# Patient Record
Sex: FEMALE | Race: WHITE | HISPANIC OR LATINO | Employment: UNEMPLOYED | URBAN - METROPOLITAN AREA
[De-identification: names, ages, dates, MRNs, and addresses within clinical notes are randomized per-mention and may not be internally consistent; named-entity substitution may affect disease eponyms.]

---

## 2023-03-06 ENCOUNTER — TELEPHONE (OUTPATIENT)
Dept: OBGYN | Facility: CLINIC | Age: 33
End: 2023-03-06
Payer: COMMERCIAL

## 2023-03-06 DIAGNOSIS — Z32.01 PREGNANCY TEST POSITIVE: Primary | ICD-10-CM

## 2023-03-06 NOTE — TELEPHONE ENCOUNTER
[facility] Call Center    Phone Message    May a detailed message be left on voicemail: yes     Reason for Call: Order(s): Other:   Reason for requested: OBI US  Date needed: 4/11/23  Provider name: Massiel      Action Taken: Message routed to:  Other: pancho rn    Travel Screening: Not Applicable

## 2023-04-10 LAB
ABO/RH(D): NORMAL
ANTIBODY SCREEN: NEGATIVE
SPECIMEN EXPIRATION DATE: NORMAL

## 2023-04-11 ENCOUNTER — APPOINTMENT (OUTPATIENT)
Dept: LAB | Facility: CLINIC | Age: 33
End: 2023-04-11
Attending: ADVANCED PRACTICE MIDWIFE
Payer: COMMERCIAL

## 2023-04-11 ENCOUNTER — ANCILLARY PROCEDURE (OUTPATIENT)
Dept: ULTRASOUND IMAGING | Facility: CLINIC | Age: 33
End: 2023-04-11
Attending: ADVANCED PRACTICE MIDWIFE
Payer: COMMERCIAL

## 2023-04-11 ENCOUNTER — PRE VISIT (OUTPATIENT)
Dept: MATERNAL FETAL MEDICINE | Facility: CLINIC | Age: 33
End: 2023-04-11

## 2023-04-11 ENCOUNTER — OFFICE VISIT (OUTPATIENT)
Dept: OBGYN | Facility: CLINIC | Age: 33
End: 2023-04-11
Attending: ADVANCED PRACTICE MIDWIFE
Payer: COMMERCIAL

## 2023-04-11 ENCOUNTER — TRANSCRIBE ORDERS (OUTPATIENT)
Dept: MATERNAL FETAL MEDICINE | Facility: CLINIC | Age: 33
End: 2023-04-11
Payer: COMMERCIAL

## 2023-04-11 VITALS
WEIGHT: 104.9 LBS | SYSTOLIC BLOOD PRESSURE: 96 MMHG | DIASTOLIC BLOOD PRESSURE: 64 MMHG | HEART RATE: 72 BPM | HEIGHT: 59 IN | BODY MASS INDEX: 21.15 KG/M2

## 2023-04-11 DIAGNOSIS — Z83.3 FAMILY HISTORY OF DIABETES MELLITUS: ICD-10-CM

## 2023-04-11 DIAGNOSIS — O26.90 PREGNANCY RELATED CONDITION, ANTEPARTUM: Primary | ICD-10-CM

## 2023-04-11 DIAGNOSIS — Z32.01 PREGNANCY TEST POSITIVE: ICD-10-CM

## 2023-04-11 DIAGNOSIS — Z34.02 ENCOUNTER FOR SUPERVISION OF NORMAL FIRST PREGNANCY IN SECOND TRIMESTER: Primary | ICD-10-CM

## 2023-04-11 LAB
DEPRECATED CALCIDIOL+CALCIFEROL SERPL-MC: 26 UG/L (ref 20–75)
ERYTHROCYTE [DISTWIDTH] IN BLOOD BY AUTOMATED COUNT: 12.2 % (ref 10–15)
HBA1C MFR BLD: 4.6 %
HBV SURFACE AB SERPL IA-ACNC: 0.74 M[IU]/ML
HBV SURFACE AB SERPL IA-ACNC: NONREACTIVE M[IU]/ML
HBV SURFACE AG SERPL QL IA: NONREACTIVE
HCT VFR BLD AUTO: 38.8 % (ref 35–47)
HCV AB SERPL QL IA: NONREACTIVE
HGB BLD-MCNC: 13.9 G/DL (ref 11.7–15.7)
HIV 1+2 AB+HIV1 P24 AG SERPL QL IA: NONREACTIVE
MCH RBC QN AUTO: 32.2 PG (ref 26.5–33)
MCHC RBC AUTO-ENTMCNC: 35.8 G/DL (ref 31.5–36.5)
MCV RBC AUTO: 90 FL (ref 78–100)
PLATELET # BLD AUTO: 217 10E3/UL (ref 150–450)
RBC # BLD AUTO: 4.32 10E6/UL (ref 3.8–5.2)
WBC # BLD AUTO: 11.6 10E3/UL (ref 4–11)

## 2023-04-11 PROCEDURE — 83036 HEMOGLOBIN GLYCOSYLATED A1C: CPT | Performed by: ADVANCED PRACTICE MIDWIFE

## 2023-04-11 PROCEDURE — 85049 AUTOMATED PLATELET COUNT: CPT | Performed by: ADVANCED PRACTICE MIDWIFE

## 2023-04-11 PROCEDURE — 76801 OB US < 14 WKS SINGLE FETUS: CPT | Mod: 26 | Performed by: OBSTETRICS & GYNECOLOGY

## 2023-04-11 PROCEDURE — 86850 RBC ANTIBODY SCREEN: CPT | Performed by: ADVANCED PRACTICE MIDWIFE

## 2023-04-11 PROCEDURE — 87086 URINE CULTURE/COLONY COUNT: CPT | Performed by: ADVANCED PRACTICE MIDWIFE

## 2023-04-11 PROCEDURE — 86762 RUBELLA ANTIBODY: CPT | Performed by: ADVANCED PRACTICE MIDWIFE

## 2023-04-11 PROCEDURE — 86803 HEPATITIS C AB TEST: CPT | Performed by: ADVANCED PRACTICE MIDWIFE

## 2023-04-11 PROCEDURE — 87389 HIV-1 AG W/HIV-1&-2 AB AG IA: CPT | Performed by: ADVANCED PRACTICE MIDWIFE

## 2023-04-11 PROCEDURE — 87340 HEPATITIS B SURFACE AG IA: CPT | Performed by: ADVANCED PRACTICE MIDWIFE

## 2023-04-11 PROCEDURE — 99207 PR PRENATAL VISIT: CPT | Performed by: ADVANCED PRACTICE MIDWIFE

## 2023-04-11 PROCEDURE — 86787 VARICELLA-ZOSTER ANTIBODY: CPT | Performed by: ADVANCED PRACTICE MIDWIFE

## 2023-04-11 PROCEDURE — 36415 COLL VENOUS BLD VENIPUNCTURE: CPT | Performed by: ADVANCED PRACTICE MIDWIFE

## 2023-04-11 PROCEDURE — 86706 HEP B SURFACE ANTIBODY: CPT | Performed by: ADVANCED PRACTICE MIDWIFE

## 2023-04-11 PROCEDURE — 82306 VITAMIN D 25 HYDROXY: CPT | Performed by: ADVANCED PRACTICE MIDWIFE

## 2023-04-11 PROCEDURE — 76801 OB US < 14 WKS SINGLE FETUS: CPT

## 2023-04-11 PROCEDURE — G0463 HOSPITAL OUTPT CLINIC VISIT: HCPCS | Mod: 25 | Performed by: ADVANCED PRACTICE MIDWIFE

## 2023-04-11 PROCEDURE — 86780 TREPONEMA PALLIDUM: CPT | Performed by: ADVANCED PRACTICE MIDWIFE

## 2023-04-11 PROCEDURE — 85660 RBC SICKLE CELL TEST: CPT | Performed by: ADVANCED PRACTICE MIDWIFE

## 2023-04-11 RX ORDER — PRENATAL VIT/IRON FUM/FOLIC AC 27MG-0.8MG
1 TABLET ORAL DAILY
COMMUNITY

## 2023-04-11 ASSESSMENT — ANXIETY QUESTIONNAIRES
GAD7 TOTAL SCORE: 7
5. BEING SO RESTLESS THAT IT IS HARD TO SIT STILL: SEVERAL DAYS
6. BECOMING EASILY ANNOYED OR IRRITABLE: MORE THAN HALF THE DAYS
2. NOT BEING ABLE TO STOP OR CONTROL WORRYING: NOT AT ALL
IF YOU CHECKED OFF ANY PROBLEMS ON THIS QUESTIONNAIRE, HOW DIFFICULT HAVE THESE PROBLEMS MADE IT FOR YOU TO DO YOUR WORK, TAKE CARE OF THINGS AT HOME, OR GET ALONG WITH OTHER PEOPLE: NOT DIFFICULT AT ALL
7. FEELING AFRAID AS IF SOMETHING AWFUL MIGHT HAPPEN: SEVERAL DAYS
GAD7 TOTAL SCORE: 7
1. FEELING NERVOUS, ANXIOUS, OR ON EDGE: SEVERAL DAYS
1. FEELING NERVOUS, ANXIOUS, OR ON EDGE: SEVERAL DAYS
3. WORRYING TOO MUCH ABOUT DIFFERENT THINGS: SEVERAL DAYS
GAD7 TOTAL SCORE: 7
2. NOT BEING ABLE TO STOP OR CONTROL WORRYING: NOT AT ALL
3. WORRYING TOO MUCH ABOUT DIFFERENT THINGS: SEVERAL DAYS
7. FEELING AFRAID AS IF SOMETHING AWFUL MIGHT HAPPEN: SEVERAL DAYS
5. BEING SO RESTLESS THAT IT IS HARD TO SIT STILL: SEVERAL DAYS
6. BECOMING EASILY ANNOYED OR IRRITABLE: MORE THAN HALF THE DAYS

## 2023-04-11 ASSESSMENT — PATIENT HEALTH QUESTIONNAIRE - PHQ9
5. POOR APPETITE OR OVEREATING: SEVERAL DAYS
5. POOR APPETITE OR OVEREATING: SEVERAL DAYS
SUM OF ALL RESPONSES TO PHQ QUESTIONS 1-9: 8

## 2023-04-11 NOTE — LETTER
"2023       RE: Jennifer Roberts  [address] [place] [address] [address]  [facility] [address]     Dear Colleague,    Thank you for referring your patient, Jennifer Roberts, to the [facility] [place] at [facility]. Please see a copy of my visit note below.    [facility] OB Intake note  Subjective   32 year old femalepresents to clinic for initiation of OB care. Patient's last menstrual period was 2023.  at 12w0d by Estimated Date of Delivery: Oct 21, 2023 based on LMP. Reviewed dating ultrasound. Pregnancy is planned.    Partner name - Sathya.        Symptoms since LMP include nausea and fatigue. Patient has tried these relief measures: diet modification, small frequent meals, increased rest and increased fluids. Discussed unisom and vit b6.    - Genetic/Infection questionnaire completed, risks include: mother with \"pre-diabetes,\" is on medication; hbg electrophoresis   Pt  does not have a recent known exposure to Parvo or CMV so IgG/IgM testing WILL NOT be ordered.   OTHER:  - Mom with pre-diabetes, takes meds    - Current Medications    Current Outpatient Medications   Medication Sig Dispense Refill     Prenatal Vit-Fe Fumarate-FA (PRENATAL MULTIVITAMIN W/IRON) 27-0.8 MG tablet Take 1 tablet by mouth daily           - Co-morbids  History reviewed. No pertinent past medical history.  - Risk for GDM : First degree relative with GDM or DM so  WILL have an  Hgb A1C, discuss early 1 hour    - High risk factors for Pre E-  No known risk factors of High risk for Pre E     Pregnant individuals at high risk of preeclampsia with one or more of the following risk factors:  History of preeclampsia, especially when accompanied by an adverse outcome  Hx of Gestational Hypertension (new 2022 per [facility])  Multifetal gestation  Chronic hypertension  Pregestational type 1 or 2 diabetes  Kidney disease  Autoimmune disease (ie, systemic lupus erythematous, " antiphospholipid syndrome)  Combinations of multiple moderate-risk factors    - Moderate risk factor for Pre E Nulliparity   Meets one high risk factors or one of the moderate risk facrtors  Nulliparity  Obesity (ie, body mass index > 30)  Family history of preeclampsia (ie, mother or sister)  Black race (as a proxy for underlying racism)  Lower income  Age 35 years or older  Personal history factors (eg, low birth weight or small for gestational age, previous adverse pregnancy outcome, >10-year pregnancy interval)  In vitro fertilization  so WILL NOT consider starting low dose aspirin (81mg) starting between 12 and 28 weeks to prevent early onset preeclampsia      - The patient  does not have a history of spontaneous  birth so  WILL NOT consider progesterone starting at 16-20 weeks and/or serial transvaginal cervical length ultrasounds from 16-24 weeks.     -The patient does not have a history of immunosuppresion or HIV so Toxoplasma IgG/IgM WILL NOT be ordered.    -Assess risk for asymptomatic latent TB (prior infection, recent immigrant from epidemic areas, immunosuppression, living in overcrowded environment):   WILL NOT have PPD skin test or Quantiferon-TB Gold Plus blood draw. *both options valid*       PERSONAL/SOCIAL HISTORY  , maira    Employment: not working   Her partner works as an   History of anxiety or depression  no-  Additional items: Denies past or present domestic violence, sexual and psychological abuse.    Objective  -VS: reviewed and within normal limits   -General appearance: no acute distress, patient is comfortable   NEUROLOGICAL/PSYCHIATRIC   - Oriented x3,   -Mood and affect: : normal     Last pap 1.5 years ago, WNL    Assessment/Plan  Jennifer was seen today for prenatal care.    Diagnoses and all orders for this visit:    Encounter for supervision of normal first pregnancy in second trimester  -     ABO/Rh type and screen  -     CBC with platelets  -     Vitamin D  Deficiency  -     Rubella Antibody IgG  -     Treponema Abs w Reflex to RPR and Titer  -     HIV Antigen Antibody Combo  -     Hepatitis B surface antigen  -     Hepatitis B Surface Antibody  -     Hepatitis C antibody  -     Urine Culture  -     Varicella Zoster Virus Antibody IgG  -     Hemoglobin A1c  -     Mat Fetal [facility] Referral - Pregnancy; Future  -     Hgb with reflex to ELP or RBC Solubility (Sickle Cell Screen)    Family history of diabetes mellitus        32 year old  12w3d weeks of pregnancy with VANDANA of Oct 21, 2023 by LMP of Patient's last menstrual period was 2023.. Ultrasound confirms.   Outpatient Encounter Medications as of 2023   Medication Sig Dispense Refill     Prenatal Vit-Fe Fumarate-FA (PRENATAL MULTIVITAMIN W/IRON) 27-0.8 MG tablet Take 1 tablet by mouth daily       No facility-administered encounter medications on file as of 2023.      Orders Placed This Encounter   Procedures     CBC with platelets     Vitamin D Deficiency     Rubella Antibody IgG     Treponema Abs w Reflex to RPR and Titer     HIV Antigen Antibody Combo     Hepatitis B surface antigen     Hepatitis B Surface Antibody     Hepatitis C antibody     Varicella Zoster Virus Antibody IgG     Hemoglobin A1c     Hgb with reflex to ELP or RBC Solubility (Sickle Cell Screen)     [facility] Referral - Pregnancy     Adult Type and Screen                 Orders Placed This Encounter   Procedures     CBC with platelets     Vitamin D Deficiency     Rubella Antibody IgG     Treponema Abs w Reflex to RPR and Titer     HIV Antigen Antibody Combo     Hepatitis B surface antigen     Hepatitis B Surface Antibody     Hepatitis C antibody     Varicella Zoster Virus Antibody IgG     Hemoglobin A1c     Hgb with reflex to ELP or RBC Solubility (Sickle Cell Screen)     [facility] Referral - Pregnancy     Adult Type and Screen     ABO/Rh type and screen       - Oriented to Practice, types of care, and how to reach  a provider.  Pt prefers CNM team  - Patient received 1st trimester new OB education packet complete with aide of [employer] Expectant Family booklet including information on genetic screening test options.  - Patient desires 1st trimester screening and desires level II ultrasound which were ordered.  - Educational handout on the prevention of infections diseases during pregnancy provided.  - Patient was encouraged to start prenatal vitamins as tolerated.      - Pregnancy concerns to be addressed by provider at new OB exam include: early 1 hour, gc/ct.    - OBI education reviewed.  - OBI labs ordered.  - Hbg electrophoresis added to labs.  - HbgA1c added to labs. Discuss early 1 hour at NOB.  - Pap up to date.  - Needs GC/CT at NOB.  - [facility] referral placed for 1st trimester screening and level 2 ultrasound.     Pt to RTO for NOB visit in 2-4 weeks and prn if questions or concerns    JARED Lowery CNM                Again, thank you for allowing me to participate in the care of your patient.      Sincerely,    Massiel Glaser CNM

## 2023-04-11 NOTE — PATIENT INSTRUCTIONS
"Thank you for trusting us with your care!     If you need to contact us for questions about:  Symptoms, Scheduling & Medical Questions; Non-urgent (2-3 day response) Russcj message, Urgent (needing response today) [phone] (if after 3:30pm next day response)   Prescriptions: Please call your Pharmacy   Billing: [facility] [phone] or [facility]:[phone]      Pregnancy: Your First Trimester Changes  The first trimester is a time of rapid development for your baby. Because your baby is growing so quickly, it is important that you start a healthy lifestyle right away. By the end of the first trimester, your baby has formed all of its major body organs and weighs just over an ounce.  Month 1 (weeks 1 to 4)  The placenta (the organ that nourishes your baby) begins to form. The brain, spinal cord, heart, gastrointestinal tract, and lungs begin to develop. Your baby is about   inch long by the end of the first month.     Actual size of baby is 1/4\".     Month 2 (weeks 5 to 8)  All of your baby s major body organs form. The face, fingers, toes, ears, and eyes appear. By the end of the month, your baby is about 1 inch long.     Actual size of baby is 1\".     Month 3 (weeks 9 to 12)  Your baby can open and close its fists and mouth. The sexual organs begin to form. As the first trimester ends, your baby is about 3 inches long.     Actual size of baby is 3\".     [employer] last reviewed this educational content on 8/1/2020 2000-2022 [employer]. All rights reserved. This information is not intended as a substitute for professional medical care. Always follow your healthcare professional's instructions.          Vitamin B6 (pyridoxine) Oral Tablet  Uses  This medicine is used for the following purposes:  metabolism disorder  nausea and vomiting  vitamin deficiency  Instructions  This medicine may be taken with or without food.  Store at room temperature away from heat, light, and moisture. Do not " keep in the bathroom.  If you forget to take a dose on time, take it as soon as you remember. If it is almost time for the next dose, do not take the missed dose. Return to your normal dosing schedule. Do not take 2 doses of this medicine at one time.  Drug interactions can change how medicines work or increase risk for side effects. Tell your health care providers about all medicines taken. Include prescription and over-the-counter medicines, vitamins, and herbal medicines. Speak with your doctor or pharmacist before starting or stopping any medicine.  Speak with your doctor or pharmacist before starting any other vitamins.  It is very important that you follow your doctor's instructions for all blood tests.  Cautions  Tell your doctor and pharmacist if you ever had an allergic reaction to a medicine.  Do not use the medication any more than instructed.  Tell the doctor or pharmacist if you are pregnant, planning to be pregnant, or breastfeeding.  Side Effects  If you have any of the following side effects, you may be getting too much medicine. Please contact your doctor to let them know about these side effects.  drowsiness or sedation  numbness or tingling in hands and feet  headaches  nausea  stomach upset or abdominal pain  This medicine usually has no side effects.  A few people may have an allergic reaction to this medicine. Symptoms can include difficulty breathing, skin rash, itching, swelling, or severe dizziness. If you notice any of these symptoms, seek medical help quickly.  Extra  Please speak with your doctor, nurse, or pharmacist if you have any questions about this medicine.  [email or web address]  IMPORTANT NOTE: This document tells you briefly how to take your medicine, but it does not tell you all there is to know about it. Your doctor or pharmacist may give you other documents about your medicine. Please talk to them if you have any questions. Always follow their advice.  There is a more complete description of this medicine available in English. Scan this code on your smartphone or tablet or use the web address below. You can also ask your pharmacist for a printout. If you have any questions, please ask your pharmacist. The display and use of this drug information is subject to Terms of Use. Copyright(c) 2022 [employer]     0110-0292 [employer]. All rights reserved. This information is not intended as a substitute for professional medical care. Always follow your healthcare professional's instructions.          Adapting to Pregnancy: First Trimester  As your body adjusts during your first trimester of pregnancy, you may have to change or limit your daily activities. You ll need more rest. You may also need to use the energy you have more wisely.   Your changing body  Almost every part of your body is affected as you adapt to pregnancy. The uterus and cervix will start to soften right away. You may not look very pregnant during the first 3 months. But you are likely to have some common signs of early pregnancy:   Nausea  Fatigue  Frequent urination  Mood swings  Bloating of the belly  Constipation  Heartburn  Missed or light periods (first trimester bleeding)  Nipple or breast tenderness and breast swelling  It s not too late to start good habits   What matters most is protecting your baby from this moment on. If you smoke, drink alcohol, or use drugs, now is the time to stop. If you need help, talk with your healthcare provider:   Smoking increases the risk of stillbirth or having a low-birth-weight baby. If you smoke, quit now.  Alcohol and drugs have been linked with miscarriage, birth defects, intellectual disability, and low birth weight. Don't drink alcohol or take drugs.  Tips to relieve nausea  During pregnancy, nausea can happen at any time of the day, but it may be worse in the morning. To help prevent nausea:   Eat small, light meals at frequent  intervals.  Drink fluids often.  Get up slowly. Eat a few unsalted crackers before you get out of bed.  Avoid smells that bother you.  Avoid spicy and fatty foods.  Eat an ice pop in your favorite flavor.  Get plenty of rest.  Ask your healthcare provider about taking faheem or vitamin B6 for nausea and vomiting.  Talk with your healthcare provider if you take vitamins that upset your stomach.   Work concerns  The end of the first trimester is a good time to discuss working during pregnancy with your employer. Follow your healthcare provider s advice if your job needs you to stand for a long time, work with hazardous tools, or even sit at a desk all day. Your workspace, workload, or scheduled hours may need to be adjusted. Perhaps you can change body postures more often or take an extra break.   Advice for travel  Talk to your healthcare provider first, but the second trimester may be the best time for any travel. You may be advised to avoid certain trips while you re pregnant. Food and water can be concerns in developing countries. Travel by car is a good choice, as you can stop, get out, and stretch. Bring snacks and water along. Fasten the lap belt below your belly, low over your hips. Also be sure to wear the shoulder harness.   Intimacy  Unless your healthcare provider tells you to, there's no reason to stop having sex while you re pregnant. You or your partner may notice changes in desire. Desire may be less in the first trimester, due to nausea and fatigue. In the second trimester, sex may be very enjoyable. The third trimester can be a challenge comfort-wise. Try different positions and see what s best for you both.   [employer] last reviewed this educational content on 4/1/2020 2000-2022 [employer]. All rights reserved. This information is not intended as a substitute for professional medical care. Always follow your healthcare professional's instructions.          Pregnancy: Common  Questions  There are plenty of myths and  old wives  tales  about pregnancy. You may need help  fact from fiction. On this sheet, you ll find answers to a few common questions. If you have other questions, talk with your healthcare provider.    Will working harm my baby?  In most cases, working throughout your pregnancy is not harmful at all. There may be concerns if the job involves dangerous machinery or chemicals, lifting, or standing for very long periods of time. Talk with your healthcare provider and employer about your particular job and pregnancy.  Is it safe to have sex during pregnancy?  Yes, unless you are specifically advised not to by your healthcare provider.  Why can t I change the cat litter box?  Cats carry a disease called toxoplasmosis. In adult humans, it shows up as a mild infection of the blood and organs. If you are infected during pregnancy, the baby s brain and eyes could be damaged. To be safe, have someone else change the litter. If you must handle it, wear a paper mask over your nose and mouth. Also, wear gloves and wash your hands afterward.  Which medicines are safe?  No prescription or over-the-counter medicine is safe for everyone all of the time. But sometimes medicines are needed. Be sure your healthcare provider knows you are pregnant. Then use only the medicines he or she advises you to take.  Is it true that I can overheat my baby?  Yes. To prevent making your baby too warm:  Don t sit in a Jacuzzi. A long, warm bath is fine, but not in water over 100 F (37.7 C).  Exercise less intensely if you feel tired. Base your workout on how you feel, not your heart rate. Heart rates aren t a good way to measure effort during pregnancy.  Can I lift and carry safely?  Yes, if your healthcare provider doesn t tell you otherwise. Learn to lift and carry safely to prevent injury and reduce back pain during pregnancy. To protect your back:  Bend at the knees to bring the load  nearer.  Get a good . Test the weight of the load.  Tighten your belly. Exhale as you lift.  Lift with your legs, not with your back.  Carry the load close to your body.  Hold the load so you can see where you are going.  What if I get sick?  Most women get sick at least once during pregnancy. Talk with your healthcare provider if you do. Most likely it will not affect your pregnancy. Get plenty of rest and fluids, and eat what you can. Talk with your provider before taking any medicines.  [employer] last reviewed this educational content on 8/1/2020 2000-2022 [employer]. All rights reserved. This information is not intended as a substitute for professional medical care. Always follow your healthcare professional's instructions.          Comfort Tips During Pregnancy  Pregnancy can bring discomfort of different kinds. Below are tips for ways to feel better. Talk with your healthcare provider before using pain-relieving medicine at any time during your pregnancy.    First trimester tips  Easing nausea  Get up slowly. Eat a few unsalted crackers before you get out of bed.  Avoid smells that bother you.  Eat small, bland, low-fat, high-protein meals at frequent intervals.  Sip on water, weak tea, or clear soft drinks, like ginger ale. Eat ice chips.  Try taking vitamin B6.  Coping with fatigue  Take catnaps when you can.  Get regular exercise.  Accept help from others.  Practice good sleep habits, like going to bed and getting up at the same time each day. Use your bed only for sleep and sex.  Calming mood swings  Talk about your feelings with others, including other mothers.  Limit sugar, chocolate, and caffeine.  Eat a healthy diet. Don t skip meals.  Get regular exercise.  Soothing headaches  Get fresh air and exercise.  Relax and get enough rest.  Check with your healthcare provider before taking any pain medicines.    Second trimester tips  To limit ankle swelling, sit with your feet raised or  wear support hose.  If you have pain in your groin and stomach (round ligament pain), don't make sudden twisting movements with your body.  For leg cramps, flexing your foot often brings immediate relief. Also try massaging your calf in long, downward strokes, or stretching your legs before going to bed. Get enough exercise and wear shoes with flexible soles. Eat foods rich in calcium.    Third trimester tips  Reducing heartburn  Eat small, light meals throughout the day rather than 3 large ones.  Sleep with your upper body raised 6 inches. Don t lie down until 2 hours after you eat.  Don't eat greasy, fried, or spicy foods.  Don't have citrus fruits or juices.  Treating constipation  Eat foods high in fiber, such as whole-grain foods, and fresh fruit and vegetables).  Drink plenty of water.  Get regular exercise.  Ask about your healthcare provider about medicines that have docusate or psyllium.  Taking care of your breasts  Don't use harsh soaps or alcohol, which can make your skin too dry.  Wear nursing bras. They provide more support than regular bras and can be used after pregnancy if you breastfeed.  Getting a good night s sleep  Take a warm shower before bed.  Sleep on a firm mattress.  Lie on your side with 1 leg crossed over the other.  Use pillows to support your arms, legs, and belly.    [employer] last reviewed this educational content on 8/1/2020 2000-2022 [employer]. All rights reserved. This information is not intended as a substitute for professional medical care. Always follow your healthcare professional's instructions.          Folic Acid Supplements  Folic acid is also called folate. It is one of the B vitamins. Nutrition experts are just starting to learn more about how folic acid helps the body. It is needed to prevent a shortage of red blood cells (a type of anemia). Experts have also found that folic acid can prevent some birth defects.     How much folic acid do you  need?  Adults should have 400 mcg (micrograms) of folic acid each day. Adults may need more if they are planning to get pregnant, are pregnant, or are breastfeeding. Talk with your healthcare provider to find the right amount of folic acid for you.   Why use a supplement?  Taking folic acid both before and during pregnancy is important. This can prevent birth defects of the spine and brain (neural tube defects). A supplement may also be helpful if you drink alcohol often. You may want to use a folic acid supplement if any of the following is true for you:   [] I am a person of childbearing age.   [] I am planning to get pregnant.  [] I rarely eat leafy green vegetables, dried beans, or lentils.   [] I rarely eat cereal and whole grains (wheat germ, brown rice, whole-wheat bread).  [] I often have more than 1 drink of alcohol a day.   If you take folic acid  Here are some tips to help you get the most from a folic acid supplement:  Read the label to be sure the product won't  soon.  Choose a supplement that provides 400 to 800 mcg of folic acid.  Store the supplement in a cool, dry place, away from sun and heat.  Eat a healthy diet to get all the nutrients your body needs.  Foods that have folic acid  Folic acid is found mainly in plants. Some good sources include:  Dark green leafy vegetables such as spinach, kale, collards, and jeaneth lettuce  Lentils, chickpeas, and dried beans such as sabillon, kidney, and black beans  Asparagus, [place], broad-beans, broccoli, and [place] sprouts  Avocados, oranges, and orange juice  Wheat germ and whole-wheat products  Products fortified with folic acid, such as cereal, pasta, bread, and rice  [employer] last reviewed this educational content on 2022-2022 [employer]. All rights reserved. This information is not intended as a substitute for professional medical care. Always follow your healthcare professional's instructions.          Anemia During  Pregnancy  Anemia is a condition in which the red blood cell count is too low. In pregnant women, this is often caused by not having enough iron in the blood. Anemia is common in pregnancy and very easy to treat.   Why you need iron   While pregnant, your body uses iron to make red blood cells for you and your baby. These cells bring oxygen to your baby and to the rest of your body. Not having enough red blood cells can cause your baby to be born too small. But this is rare, as it s easy for you to get enough iron.   Testing for anemia   The only way to know if you have anemia is to have a simple test called a CBC (complete blood count). This is a routine test that will be done at one of your first prenatal visits. This test may be done again, at about week 26 to week 28.   Treating anemia   If you have anemia due to low iron content, follow the advice of your healthcare provider. Eating foods high in iron and taking supplements can help you get the iron you need.   Eating foods high in iron      Green leafy vegetables and nuts are a good source of iron.     Eat foods that are high in iron such as:   Red meat (limit organ meats such as liver)  Seafood (be sure it s fully cooked), and don't eat fish that are high in mercury, such as swordfish, tilefish, jyothi mackerel, and shark  Tofu  Eggs  Green, leafy vegetables  Whole grains and iron-fortified cereals  Dried fruits and nuts  Taking iron supplements   In most cases, a prenatal vitamin can provide enough iron. But if you need more, your healthcare provider may prescribe an iron supplement. Swallow iron pills with a glass of orange or cranberry juice. The vitamin C in these fruit juices can help your body absorb iron. But don t take your iron pills with juices that have calcium added to them. They can keep your body from absorbing the iron.   Iron supplements   Iron supplements may have certain side effects. They may cause your stools to turn black, and make you feel  sick to your stomach or constipated. Here are some tips that may help you limit side effects:   Start slowly. Take 1 pill a day for a few days. Then work up to your prescribed dose over time.  Take your pills with meals, and not at bedtime.  Increase the fiber in your diet. Eat more whole grains, fruits, and vegetables.  Do mild exercise each day.  If advised by your healthcare provider, take a stool softener.  [employer] last reviewed this educational content on 2/1/2020 2000-2022 [employer]. All rights reserved. This information is not intended as a substitute for professional medical care. Always follow your healthcare professional's instructions.          Rh-Negative Screening  If you have Rh-negative blood, your baby may be at risk for health problems. This is true only if your baby has Rh-positive blood. A simple test followed by treatment can help prevent problems.   What are the risks?  If the blood of your baby is Rh positive, your Rh-negative blood may form antibodies. These antibodies will attack the Rh-positive blood. This is called Rh disease. Rh disease can cause your baby to lose blood cells or have other health problems. Medical treatment can prevent Rh disease by keeping antibodies from forming.   How are you tested?  A simple blood test shows if you re Rh negative. This test is done very early in your pregnancy. If you re Rh negative, you ll have a second blood test near week 28 of pregnancy. This test will check whether or not your blood contains Rh antibodies.     [employer] last reviewed this educational content on 4/1/2020 2000-2022 [employer]. All rights reserved. This information is not intended as a substitute for professional medical care. Always follow your healthcare professional's instructions.          What Is Prenatal Care?  Before getting pregnant, you may have added some good health habits to get ready for your baby. But if you didn t, start today. One  of the first steps is learning how to take care of yourself. See your healthcare provider as soon as you think you may be pregnant. Then continue prenatal care during your pregnancy.     Prenatal care helps you have a healthy baby   During prenatal care:  Your healthcare provider checks the health of your pregnancy. They'll calculate a due date. This gives an estimate of your baby's delivery. Many people give birth between 38 and 41 weeks of pregnancy. Your due date is found by counting 40 weeks from the first day of your last menstrual period.  Your pregnancy's progress is checked. This includes your baby s growth, fetal heart rate, changes in your weight and blood pressure, and your overall health and comfort.  Your provider may find new concerns and manage current ones before problems happen.  Your provider will check lab work through blood and urine.  Your provider will talk about normal changes that happen during pregnancy. They'll also talk about changes that may not be normal. And they'll advise you about lifestyle changes.  Your provider will answer your questions. They'll also help you get ready for labor and delivery.  You're part of a team  When you re pregnant, you re part of a team. This team includes you, your baby, and your provider. It also may include a partner or a main support person. That could be a loved one, such as a spouse, a family member, or a friend. As you work to give your baby a healthy start, rely on your team members for support.   It s not too late to start good habits   What matters most is protecting your baby from this moment on. If you smoke, drink alcohol, or use illegal drugs, now's the time to stop. If you need help, talk with your healthcare provider.   Smoking increases the risk of losing your baby. Or of having a low-birth-weight baby. If you smoke, quit now.  Alcohol and drugs have been linked with many problems. These include miscarriage, birth defects, intellectual  disability, and low birth weight. Stay away from alcohol and drugs.  Eat a healthy diet. This helps keep you and your baby strong and healthy. Follow your provider's instructions for nutrition. Also stay within the guidelines you're given for healthy weight gain.  Take 400 micrograms to 800 micrograms (400 mcg to 800 mcg or 0.4 mg to 0.8 mg) of folic acid every day. Take it for at least 1 month before getting pregnant. And keep taking it for the first trimester of your pregnancy. This is to lower your risk of some brain and spinal birth defects. You can get folic acid from some foods. But it's hard to get all the folic acid you'll need from foods alone. Talk with your provider about taking a folic acid supplement.  Regular exercise will help you stay fit and feel good during pregnancy. It can also help prevent or reduce back pain. Talk with your provider about how to exercise safely during pregnancy.  If you have a health condition, make sure it's under control. Some conditions include asthma, diabetes, depression, high blood pressure, obesity, thyroid disease, or epilepsy. Be sure your vaccines are up to date.  How daily issues affect your health  Many things in your daily life impact your health. This can include transportation, money problems, housing, access to food, and . If you can t get to medical appointments, you may not receive the care you need. When money is tight, it may be difficult to pay for medicines. And living far from a grocery store can make it hard to buy healthy food.   If you have concerns in any of these or other areas, talk with your healthcare team. They may know of local resources to assist you. Or they may have a staff person who can help.   [employer] last reviewed this educational content on 8/1/2020 2000-2022 [employer]. All rights reserved. This information is not intended as a substitute for professional medical care. Always follow your healthcare  professional's instructions.          Understanding Intimate Partner Violence  Intimate partner violence (IPV) affects hundreds of thousands of women. But the true number may be much higher because many women may not report it. Partner violence often starts or gets worse during and after pregnancy. This may be from the stress of pregnancy and a new baby. IPV can result in pregnancy complications, poor postpartum care, and poor health of the baby.  You may feel alone if you are experiencing intimate partner violence during or after your pregnancy, but know that you re not. It s far more common than you think. And there s help and hope. Talk with your healthcare provider if you are not safe.  Types of intimate partner violence  Most people think of IPV as just physical abuse. While it does often include physical abuse, IPV can be emotional and sexual abuse. These other forms of abuse are sometimes harder to see. This is especially true for emotional abuse, because it can distort your own viewpoint.  Physical abuse includes using physical force to hurt or disable a partner. It can include throwing objects, pushing, kicking, biting, slapping, strangling, hitting, or beating.  Emotional abuse includes making threats, and controlling money or other resources. It s anything that erodes a person s sense of self-worth. It may look like name-calling, blaming, and stalking. It can also include isolation from friends, family, and even access to healthcare.  Sexual violence includes rape, unwanted kissing, and forced touching. It also includes reproductive coercion. This is holding power and control over the woman s reproductive health. It may look like efforts to sabotage contraception, having unsafe sex to purposefully expose the woman to sexually transmitted infections (STIs). It may also include forced  or injuries to cause a miscarriage.  Are you at risk for IPV?  IPV affects all genders, races, ethnicities, and  "social and economic statuses. But some people are at greater risk for being a victim or an abuser. Certain factors can increase the risk for IPV.  Individual factors  Having low self-esteem, being emotional dependent, insecure, and jealous  Having low income or being unemployed, having recent job loss or job instability  Being younger  Using alcohol or drugs  Being depressed, angry, having PTSD, or being hostile towards women  Having a history of abusing others, being abused, or witnessing abuse  Having poor problem solving skills  Having poor impulse control  Being a Native , , or  woman  Relationship factors  Marital or relationship conflict, frequent fighting  Having a jealous or possessive partner  Having control issues  Being under economic stress  Having poor social support  Having income inequality    It often helps to ask yourself these questions from the [employer] of [employer] to know if you are in an abusive relationship:  Does my partner always put me down and make me feel bad about myself?  Has my partner caused harm or pain to my body?  Does my partner threaten me, the baby, my other children or himself?  Does my partner blame me for his actions? Does he tell me it's my own fault he hit me?  Is my partner becoming more violent as time goes on?  Has my partner promised never to hurt me again, but still does?  If you answered \"yes\" to any of these questions, you may be in an unhealthy relationship.  If you recognize yourself or your partner in any of these descriptors, talk with your healthcare provider to get help. If you are in danger, he or she can help you develop a safety plan.  Health effects  IPV during or after pregnancy can cause physical and emotional health effects, pregnancy complications, poor outcomes, and injury and harm to the baby after birth.  During pregnancy  Physical injuries such as bruises, cuts, or broken bones  Vaginal bleeding or pelvic pain  Early " labor and delivery  Tearing of the placenta (placental abruption)  Maternal death  Infant health  Low infant birth weight  Infant who needs NICU care  Broken bones  Death of   After birth  After birth, the mother may:  Have pain and other long-term health conditions  Develop postpartum depression (2 to 3 times greater risk)  Have high-risk sexual behaviors  Use harmful substances  Have unhealthy diet and lifestyle such as eating disorders  Abuse is never OK. One in 6 abused women are first abused during pregnancy, when it s dangerous to both you and your developing baby. Recognizing that you are in an abusive relationship is the first step to help. See your healthcare provider or someone else you trust who can help you develop a safety plan.  What to expect from your healthcare provider  It can be a hard to bring up partner violence with your healthcare provider. But it s an important first step in getting help. Rest assured, your conversation is confidential. He or she is a non-judgmental health professional. He or she likely has other patients with similar problems and recognizes the difficulty of the situation. Your provider may ask you questions such as:  Do you feel safe in your relationship?  Do you have a safe place to go in an emergency?  Do conflicts ever turn into physical fights?  Answer honestly and completely. There will be no pressure to disclose the abuse or to press charges. He or she won t ask about the abuse in front of a partner, friends, or family. The goal is to help you access help when you are ready.  Call   If you feel your safety is in jeopardy now, call or the police.  For more help  If the person who hurt you is your partner or spouse and your situation can become dangerous again, it's vital to make a safety plan. The National Domestic Violence Hotline can help you develop a plan that meets your personal situation.  National Domestic Violence Hotline , www.thehoAasonn.org,  053-864-4344  [employer] last reviewed this educational content on 5/1/2020 2000-2022 [employer]. All rights reserved. This information is not intended as a substitute for professional medical care. Always follow your healthcare professional's instructions.          Bleeding During Early Pregnancy   If you ve had bleeding early in your pregnancy, you re not alone. Many other pregnant women have early bleeding, too. And in most cases, nothing is wrong. But your healthcare provider still needs to know about it. They may want to do tests to find out why you re bleeding. Call your provider if you see bleeding during pregnancy. Tell your provider if your blood is Rh negative. Then they can figure out if you need anti-D immune globulin treatment.   What causes early bleeding?   The cause of bleeding early in pregnancy is often unknown. But many factors early on in pregnancy may lead to light bleeding (called spotting) or heavier bleeding. These include:   Having sex  When the embryo implants on the uterine wall  Bleeding between the sac membrane and the uterus (subchorionic bleeding)  Pregnancy loss (miscarriage)  The embryo implants outside of the uterus (ectopic pregnancy)  If you see spotting   Light bleeding is the most common type of bleeding in early pregnancy. If you see it, call your healthcare provider. Chances are, they will tell you that you can care for yourself at home.   If tests are needed   Depending on how much you bleed, your healthcare provider may ask you to come in for some tests. A pelvic exam, for instance, can help see how far along your pregnancy is. You also may have an ultrasound or a Doppler test. These imaging tests use sound waves to check the health of your baby. The ultrasound may be done on your belly or inside your vagina. You may also need a special blood test. This test compares your hormone levels in blood samples taken 2 days apart. The results can help your provider  learn more about the implantation of the embryo. Your blood type will also need to be checked to assess if you will need to be treated for Rh sensitization.       Ultrasound can help check the health of your fetus.     Warning signs   If your bleeding doesn t stop or if you have any of the following, get medical care right away:   Soaking a sanitary pad each hour  Bleeding like you re having a period  Cramping or severe belly pain  Feeling dizzy or faint  Tissue passing through your vagina  Bleeding at any time after the first trimester  Questions you may be asked   Bleeding early in pregnancy isn't normal. But it is common. If you ve seen any bleeding, you may be concerned. But keep in mind that bleeding alone doesn t mean something is wrong. Just be sure to call your healthcare provider right away. They may ask you questions like these to help find the cause of your bleeding:   When did your bleeding start?  Is your bleeding very light or is it like a period?  Is the blood bright red or brownish?  Have you had sex recently?  Have you had pain or cramping?  Have you felt dizzy or faint?  Monitoring your pregnancy   Bleeding will often stop as quickly as it began. Your pregnancy may go on a normal path again. You may need to make a few extra prenatal visits. But you and your baby will most likely be fine.   [employer] last reviewed this educational content on 1/1/2022 2000-2022 [employer]. All rights reserved. This information is not intended as a substitute for professional medical care. Always follow your healthcare professional's instructions.          Healthy Eating Habits During Pregnancy  It s important to develop healthy eating habits while you are pregnant, for you as well as for your baby. Here are some ways to stay healthy.   Aim for a healthy weight  A slow, steady rate of weight gain is often best. After the first trimester, you may gain about a pound a week. If you were overweight before  pregnancy, you need to gain fewer pounds. Your healthcare provider can give you a healthy weight goal for your pregnancy.   Don t diet  Now is not the time to diet. You may not get enough of the nutrients you and your baby need. Instead, learn how to be a healthy eater. Start by doing it for your baby. Soon, you may do it for yourself.   Vitamins and supplements  Talk with your healthcare provider about taking these and other prenatal vitamins and supplements.   Iron makes the extra blood you need now.  Calcium and vitamin D help build and keep strong bones.  Folic acid helps prevent certain birth defects.  Iodine helps the thyroid work right.  Some vitamins may not be safe to take. Your healthcare provider will tell you which ones to avoid.  Fluids    Drink at least 8 to 10 cups of fluid daily. Your baby needs fluids. Fluids also decrease constipation, flush out toxins and waste, limit swelling, and help prevent bladder infections. Water is best. Other good choices are:   Water or seltzer water with a slice of lemon or lime. (These can also help ease an upset stomach.)  Clear soups that are low in salt  Low-fat or fat-free milk, soy or rice milk with calcium added  Popsicles or gelatin  Things to avoid  Some things might harm your growing baby. Don t eat or drink:   Alcohol  Unpasteurized dairy foods and juices  Raw or undercooked meat, poultry, fish, or eggs  Unwashed fruits and vegetables  Prepared meats, like deli meats or hot dogs, unless heated until steaming hot  Fish that are high in mercury, like shark, swordfish, jyothi mackerel, tilefish, and albacore tuna  Things to limit  Ask your healthcare provider whether it s safe to eat or drink:   Caffeine  Artificial sweeteners  Organ meats  Certain types of fish  Fish and shellfish that contain mercury in lower amounts, like shrimp, canned light tuna, salmon, pollock, and catfish  [employer] last reviewed this educational content on 7/1/2021 2000-2022 [employer]  [employer]. All rights reserved. This information is not intended as a substitute for professional medical care. Always follow your healthcare professional's instructions.          Pregnancy: Planning Your Exercise Routine  While you re pregnant, an exercise routine helps both your mind and your body feel good. It tones your muscles and makes them stronger. It also gives you and your baby more oxygen.   The right exercise for you    Overall conditioning is best for you and your baby. Try walking, swimming, or riding a stationary bike. Always warm up, cool down, and drink enough fluids. Keep a snack close by in case your blood sugar gets low. Discuss exercise choices with your healthcare provider. Talk about the following:   If you already exercise, find out how to adapt your routine while you re pregnant. Keep the intensity of the exercise moderate. As your pregnancy progresses, your center of gravity will change. Be careful to keep your balance.  Ask if there are any local prenatal exercise classes, such as yoga or water aerobics. Find out which prenatal exercise videos are good choices.  If you were not exercising before your pregnancy, find out the best way to start. Now is not the time to begin a new workout on your own. Start slowly. Listen to your body.  Ask which forms of exercise you should avoid. These may include risky activities like hot yoga, horseback riding, scuba diving, skiing, skating, and contact sports.  Pelvic tilts  These help strengthen your stomach muscles and low back. You can do pelvic tilts instead of sit-ups.   Do this exercise on your hands and knees.  Relax the back of your neck. Pull your stomach in until your low back flattens.  Hold for 30 seconds. Release. Repeat 10 times. Do this twice a day.  Kegel exercises  Kegel exercises strengthen the pelvic muscles. Doing Kegels daily helps prepare these muscles for delivery. Kegels also help ease your recovery. You exercise these  muscles by tightening, holding, then relaxing them. To do 1 type of Kegel exercise, contract as if you were stopping your urine stream (but do it when you re not urinating). Hold for 10 seconds, then repeat 10 times, a few times a day.   Tips to add activity  Here are some tips to follow:  Park the car farther from a store and walk.  If you can, do errands on foot instead of driving.  Walk across the office to talk to someone in person instead of calling.  While waiting for appointments, go up and down stairs or around the block.  Tips to stay active  Here are some tips to follow:  Maintain your routine. But exercise less intensely if you feel tired.  Base your workout on how you feel, not your heart rate. Heart rates aren t a good way to measure effort during pregnancy.  Don't exercise on your back after week 16.  What are the warning signs that I should stop exercising?  Stop exercising and call your healthcare provider if you have any of these symptoms:  Vaginal bleeding   Dizziness or feeling faint   Increased shortness of breath   Chest pain   Headache   Muscle weakness   Calf (back of the leg) pain or swelling    Uterine contractions or  labor   Decreased fetal movement   Fluid leaking (or gushing) from your vagina  [employer] last reviewed this educational content on 2021-2022 [employer]. All rights reserved. This information is not intended as a substitute for professional medical care. Always follow your healthcare professional's instructions.          Nutrition During Pregnancy   Having a healthy baby depends mostly on you. What you eat matters to your baby and your health. During pregnancy, you will likely need about 300 more calories per day than before you became pregnant. Each day, try to eat the number of servings listed here for each food group. In addition, cut down on salt and caffeine. Limit the amount of sweets and high-fat foods you eat. Don t smoke or drink  alcohol.     Important: See your healthcare provider as often as requested. If you have any questions, be sure to ask them.   Fruits Vegetables Grains & Cereals* Fats & Oils   2 cups  Examples of 1-cup servings:   1 medium apple  1 medium orange  1 medium banana  1 cup chopped fruit  1 cup 100% fruit juice (pasteurized)   1/2 cup dried fruit 2-1/2 to 3 cups   Examples of 1 servin cups raw, leafy greens   1 cup raw or cooked cut-up vegetables   1 cup 100% vegetable juice (pasteurized)  6 to 8 ounces  Examples of 1-ounce servings:   1 slice bread  1/2 cup cooked rice  1/2 cup cooked cereal   1/2 cup pasta  1 ounce cold cereal 6 to 8 teaspoons   Dairy** Protein--- Fluids      3 cups  Examples of 1-cup servings:   1 cup milk  1 cup yogurt  1-1/2 ounces natural cheese   2 ounces processed cheese  5 to 6-1/2 ounces  Examples of 1-ounce servings:   1 egg  1 ounce of lean meat, poultry, or fish   1/4 cup cooked beans   1 tablespoon peanut butter   1/2 ounce nuts 8 or more 8-ounce glasses   Examples:  Water  Mineral water  Clear soups, broth      *Note: Choose whole grains whenever possible.   ** Note: Try to choose low-fat options; stay away from soft cheeses and unpasteurized milk.   --- Notes: Don't eat raw or undercooked meats, eggs, seafood, fish, and shellfish. Also, some types of fish, such as shark, swordfish, and jyothi mackerel, should not be eaten during pregnancy. Don't eat hot dogs, lunch meats, or cold cuts unless heated to steaming just before being served. Ask your healthcare provider about safe choices.   Prenatal supplements  A prenatal supplement is a pill that you take daily during pregnancy. It helps make sure you re getting the right amount of certain nutrients that are important to your baby. Ask your healthcare provider to help you choose the best one for you. Important nutrients during pregnancy include:   Folic acid. It's best to start taking this supplement 1 month before you start trying to  get pregnant. Folic acid helps prevent certain problems in your baby. During pregnancy, you need to take 400 micrograms (mcg) of folic acid every day for the first 2 to 3 months after conception. After that, 600 mcg is needed for a growing baby and placenta.  Iron, calcium, and vitamin D. You may also be advised to take these supplements during pregnancy. They help keep you and your baby healthy. Take them at different times because calcium makes it hard for the body to absorb iron. Taking iron with orange juice helps to increase its absorption.  "[employer] last reviewed this educational content on 8/1/2020 2000-2022 [employer]. All rights reserved. This information is not intended as a substitute for professional medical care. Always follow your healthcare professional's instructions.          Pregnancy: Your Weight  Being a healthy weight is important for both you and your baby. The weight you gain now is not just extra fat. It is also the weight of your baby. And it is the increased blood and fluids to support the baby. A slow, steady rate of gain is best. How much you should gain depends on your weight before getting pregnant. Check with your healthcare provider to find out what is right for you.      Your weight will be checked regularly by your healthcare provider.     Talk to your healthcare provider if you have any questions.   If you gain too much  Gaining too much weight might cause you to feel tired, or you could have a harder pregnancy or birth. If you and your healthcare provider decide you re gaining too much:   Eat fewer fats and sugars. Instead, eat fruit, vegetables, and whole-grain foods.  Drink plenty of water between meals.  Get at least 20 minutes of light exercise, like walking, each day.  Don t diet. You might not get enough of the nutrients you and your baby need.  Keep a food diary to help you gauge what and how much you are eating.  If you're not gaining enough  If you don t  gain enough, your baby could be too small or have health problems. Women tend to gain most of their weight in the second and third trimesters. For now:   Eat many types of foods. Make sure you get enough calcium, protein, and carbohydrates.  Don t skip meals.  Eat healthy snacks.  Pick nutrient-dense, high-calorie healthy food like trail mix or protein shakes.  See a dietitian for help.  Talk to your healthcare provider if you have had an eating disorder or problems with certain foods.  The following are ways to get more calories:  Eat breakfast every day. Peanut butter or a slice of cheese on toast can give you an extra protein boost.  Snack between meals. Yogurt and dried fruits can provide protein, calcium, and minerals.  Try to eat more foods that are high in good fats, like nuts, fatty fish, avocados, and olive oil.  Drink juices made from real fruit that are high in vitamin C or beta-carotene, like grapefruit juice, orange juice, papaya nectar, apricot nectar, and carrot juice.  Don't eat junk food, like foods high in sugar.  [employer] last reviewed this educational content on 7/1/2021 2000-2022 [employer]. All rights reserved. This information is not intended as a substitute for professional medical care. Always follow your healthcare professional's instructions.        You have been provided the CDC Warning Signs in Pregnancy document.    Additional copies can be found here: [email or web address]

## 2023-04-11 NOTE — NURSING NOTE
MARLENE RN Prenatal Intake note  Subjective     32 year old female newly pregnant.  LMP: 1/14/23.    exact  Pregnancy is planned.    Partner/support person name and relationship - Sathya .        Symptoms since LMP include nausea, vomiting and loss of appetite. Patient has tried these relief   measures: small frequent meals and increased fluids.    OB HISTORY  First pregnancy    OB COMPLICATIONS  *First Pregnancy       PERSONAL/SOCIAL HISTORY    with partner.  Employment: Unemployed   Her partner works full time as a .   MENTAL HEALTH HISTORY:  anxiety was treated many years ago with sleep aid      - Current Medications    Current Outpatient Medications   Medication Sig Dispense Refill     Prenatal Vit-Fe Fumarate-FA (PRENATAL MULTIVITAMIN W/IRON) 27-0.8 MG tablet Take 1 tablet by mouth daily             - Co-morbids  History reviewed. No pertinent past medical history.    - Genetic/Infection questionnaire completed, risks include possible bleeding disorder in sister. Discussed genetic screening options, patient does desire first trimester genetic screening  Pt  does not have a recent known exposure to Parvo or CMV so IgG/IgM testing WILL NOT be ordered.   Flu Vaccine.  Patient declined, do not offer  COVID Vaccine: 2 doses of covid vaccine, not most recent booster  - Discussed expectations for routine prenatal care and scheduling.  -Discussed highlights from The Expectant Family booklet on warning signs, safe pregnancy and prevention of infections diseases, nutrition and exercise.  - Patient was encouraged to start prenatal vitamins as tolerated, if experiencing nausea and vomiting then OK to switch to folic acid only at this time.    -Additional items: None  -Reconciled and reviewed problem list    Shiloh Moreira RN

## 2023-04-12 ENCOUNTER — LAB (OUTPATIENT)
Dept: LAB | Facility: CLINIC | Age: 33
End: 2023-04-12
Attending: ADVANCED PRACTICE MIDWIFE
Payer: COMMERCIAL

## 2023-04-12 ENCOUNTER — OFFICE VISIT (OUTPATIENT)
Dept: MATERNAL FETAL MEDICINE | Facility: CLINIC | Age: 33
End: 2023-04-12
Attending: ADVANCED PRACTICE MIDWIFE
Payer: COMMERCIAL

## 2023-04-12 ENCOUNTER — HOSPITAL ENCOUNTER (OUTPATIENT)
Dept: ULTRASOUND IMAGING | Facility: CLINIC | Age: 33
Discharge: HOME | End: 2023-04-12
Attending: ADVANCED PRACTICE MIDWIFE
Payer: COMMERCIAL

## 2023-04-12 DIAGNOSIS — O26.90 PREGNANCY RELATED CONDITION, ANTEPARTUM: ICD-10-CM

## 2023-04-12 DIAGNOSIS — Z34.90 SUPERVISION OF NORMAL PREGNANCY: ICD-10-CM

## 2023-04-12 DIAGNOSIS — Z36.9 FIRST TRIMESTER SCREENING: Primary | ICD-10-CM

## 2023-04-12 DIAGNOSIS — Z34.90 SUPERVISION OF NORMAL PREGNANCY: Primary | ICD-10-CM

## 2023-04-12 PROBLEM — Z78.9 NOT IMMUNE TO HEPATITIS B VIRUS: Status: ACTIVE | Noted: 2023-04-12

## 2023-04-12 PROBLEM — E55.9 VITAMIN D DEFICIENCY: Status: ACTIVE | Noted: 2023-04-12

## 2023-04-12 LAB
HGB A1 MFR BLD: 96.5 %
HGB A2 MFR BLD: 3.1 %
HGB C MFR BLD: 0 %
HGB E MFR BLD: 0 %
HGB F MFR BLD: 0.4 %
HGB FRACT BLD ELPH-IMP: NORMAL
HGB OTHER MFR BLD: 0 %
HGB S BLD QL SOLY: NORMAL
HGB S MFR BLD: 0 %
PATH INTERP BLD-IMP: NORMAL
RUBV IGG SERPL QL IA: 3.48 INDEX
RUBV IGG SERPL QL IA: POSITIVE
T PALLIDUM AB SER QL: NONREACTIVE
VZV IGG SER QL IA: 272 INDEX
VZV IGG SER QL IA: POSITIVE

## 2023-04-12 PROCEDURE — 76813 OB US NUCHAL MEAS 1 GEST: CPT | Mod: 26 | Performed by: OBSTETRICS & GYNECOLOGY

## 2023-04-12 PROCEDURE — 76813 OB US NUCHAL MEAS 1 GEST: CPT

## 2023-04-12 PROCEDURE — 36415 COLL VENOUS BLD VENIPUNCTURE: CPT

## 2023-04-12 PROCEDURE — 96040 HC GENETIC COUNSELING, EACH 30 MINUTES: CPT | Performed by: GENETIC COUNSELOR, MS

## 2023-04-12 NOTE — PROGRESS NOTES
"[facility]  Genetic Counseling Consult    Patient:  Jennifer Roberts YOB: 1990   Date of Service:  23   MRN: [id number]    Jennifer was seen at the [facility] for genetic consultation. The indication for genetic counseling is desire to discuss options for genetic screening and diagnostics. The patient was accompanied to this visit by her partner, Sathya.      The session was conducted with a [place] ipad  (Name: Jerrell, ID [id number]) due to the patient speaking limited English.      IMPRESSION/ PLAN   During today's [facility] visit, Jennifer had a blood draw for Expanded NIPS through [facility]. The core NIPS screens for trisomy 21, 18, and 13 and the patient opted to screen for sex chromosome aneuploidies, including reported fetal sex. In addition, expanded NIPS also includes microdeletion syndromes and rare autosomal trisomies. Results are expected in 7-14 days. The patient will be called with results and if they do not answer they requested a detailed message with results on their voicemail, including the predicted fetal sex information.  Patient was informed that results, including fetal sex, will be available in [facility]. Jennifer had a nuchal translucency ultrasound today. Please see the ultrasound report for further details.      PREGNANCY HISTORY   /Parity:       CURRENT PREGNANCY   Current Age: 32 year old   Age at Delivery: 33 year old  VANDANA: 10/21/2023, by Last Menstrual Period                                   Gestational Age: 12w4d  This pregnancy is a single gestation.     FAMILY HISTORY   A three-generation pedigree was obtained today and is scanned under the \"Media\" tab in Epic. The family history was reported by Jennifer and her partner. The reported family history is unremarkable for multiple miscarriages, stillbirths, birth defects, intellectual disabilities, known genetic conditions, " and consanguinity.       CARRIER SCREENING   Expanded carrier screening is available to screen for autosomal recessive conditions and X-linked conditions in a large list of genes. Autosomal recessive conditions happen when a mutation has been inherited from the egg and sperm and include conditions like cystic fibrosis, thalassemia, hearing loss, spinal muscular atrophy, and more. X-linked conditions happen when a mutation has been inherited from the egg and include conditions like fragile X syndrome.  screening was also reviewed. About [place]  Screening    As part of our conversation on carrier screening and how common or rare these condition are, we discussed the patient is of [place] ancestry and the partner is of [place] ancestry.    The patient has declined the carrier screening options today. They are aware the option will remain, and they can contact us if they would like to pursue screening.       RISK ASSESSMENT FOR CHROMOSOME CONDITIONS   We explained that the risk for fetal chromosome abnormalities increases with maternal age. We discussed specific features of common chromosome abnormalities, including Down syndrome, trisomy 13, trisomy 18, and sex chromosome trisomies.      At age 33 at delivery, the midtrimester risk to have a baby with Down syndrome is 1 in 421.     At age 33 at delivery, the midtrimester risk to have a baby with any chromosome abnormality is 1 in 217.    Jennifer has not had genetic screening in this pregnancy but elected to have screening today.      GENETIC TESTING OPTIONS   Genetic testing during a pregnancy includes screening and diagnostic procedures.      Screening tests are non-invasive which means no risk to the pregnancy and includes ultrasounds and blood work. The benefits and limitations of screening were reviewed. Screening tests provide a risk assessment (chance) specific to the pregnancy for certain fetal chromosome abnormalities but cannot definitively diagnose  or exclude a fetal chromosome abnormality. Follow-up genetic counseling and consideration of diagnostic testing is recommended with any abnormal screening result. Diagnostic testing during a pregnancy is more certain and can test for more conditions. However, the tests do have a risk of miscarriage that requires careful consideration. These tests can detect fetal chromosome abnormalities with greater than 99% certainty. Results can be compromised by maternal cell contamination or mosaicism and are limited by the resolution of current genetic testing technology.     There is no screening or diagnostic test that detects all forms of birth defects or intellectual disability.     We discussed the following screening options:   First trimester screening    Ultrasound between [id number]-55e7m that includes nuchal translucency measurement and nasal bone assessments    Nuchal translucency refers to the space at the back of the neck where fluid builds up. All babies at this stage have fluid and there is only concern if there is too much fluid    Nasal bone refers to the small bone in the nose. There is concern for conditions like Down syndrome if the bone cannot be seen at all    Blood work from arm for levels of hCG and VINI-A    Screens for trisomy 21 and trisomy 18    Cannot screen for open neural tube defects, maternal serum AFP after 15 weeks is recommended    Non-invasive prenatal testing (NIPT)    Also called cell-free DNA screening because it detects chromosomes from the placenta in the pregnant person's blood    Can be done any time after 10 weeks gestation    Screens for trisomy 21, trisomy 18, trisomy 13, and sex chromosome aneuploidies    Cannot screen for open neural tube defects, maternal serum AFP after 15 weeks is recommended      We discussed the following ultrasound options:  Nuchal translucency (NT) ultrasound    Ultrasound between [id number] that includes nuchal translucency measurement and nasal bone  assessments    Nuchal translucency refers to the space at the back of the neck where fluid builds up. All babies at this stage have fluid and there is only concern if there is too much fluid    Nasal bone refers to the small bone in the nose. There is concern for conditions like Down syndrome if the bone cannot be seen at all    This ultrasound can be done as part of first trimester screening, at the same time as another screen (NIPT), at the same time as a CVS, or if the patients does not want genetic screening.    Markers on ultrasound detects about 70% of pregnancies with aneuploidy    Abnormalities on NT ultrasound can also increase the risk for a birth defect, like a heart defect      We discussed the following diagnostic options:   Chorionic villus sampling (CVS)    Invasive diagnostic procedure done between 10w0d and 13w6d    The procedure collects a small sample from the placenta for the purpose of chromosomal testing and/or other genetic testing    Diagnostic result; more than 99% sensitivity for fetal chromosome abnormalities    Cannot screen for open neural tube defects, maternal serum AFP after 15 weeks is recommended    Amniocentesis    Invasive diagnostic procedure done after 15 weeks gestation    The procedure collects a small sample of amniotic fluid for the purpose of chromosomal testing and/or other genetic testing    Diagnostic result; more than 99% sensitivity for fetal chromosome abnormalities    Testing for AFP in the amniotic fluid can test for open neural tube defects        It was a pleasure to be involved with Jennifer tolentino care. Face-to-face time of the meeting was 60 minutes.    Nicki Riley, MEGHNA, MS, [facility]  Certified and [place] Licensed Genetic Counselor  [facility]  Maternal Fetal Medicine  Office: [phone]  [facility]: [phone]   Fax: [phone]  [facility]

## 2023-04-12 NOTE — PROGRESS NOTES
"Please see \"Imaging\" tab under \"Chart Review\" for details of today's visit.    Eleni Ackerman    "

## 2023-04-13 LAB — BACTERIA UR CULT: NORMAL

## 2023-04-15 PROBLEM — Z34.02 ENCOUNTER FOR SUPERVISION OF NORMAL FIRST PREGNANCY IN SECOND TRIMESTER: Status: ACTIVE | Noted: 2023-04-15

## 2023-04-15 PROBLEM — Z83.3 FAMILY HISTORY OF DIABETES MELLITUS: Status: ACTIVE | Noted: 2023-04-15

## 2023-04-15 NOTE — PROGRESS NOTES
"[facility] OB Intake note  Subjective   32 year old femalepresents to clinic for initiation of OB care. Patient's last menstrual period was 2023.  at 12w0d by Estimated Date of Delivery: Oct 21, 2023 based on LMP. Reviewed dating ultrasound. Pregnancy is planned.    Partner name - Sathya.        Symptoms since LMP include nausea and fatigue. Patient has tried these relief measures: diet modification, small frequent meals, increased rest and increased fluids. Discussed unisom and vit b6.    - Genetic/Infection questionnaire completed, risks include: mother with \"pre-diabetes,\" is on medication; hbg electrophoresis   Pt  does not have a recent known exposure to Parvo or CMV so IgG/IgM testing WILL NOT be ordered.   OTHER:  - Mom with pre-diabetes, takes meds    - Current Medications    Current Outpatient Medications   Medication Sig Dispense Refill     Prenatal Vit-Fe Fumarate-FA (PRENATAL MULTIVITAMIN W/IRON) 27-0.8 MG tablet Take 1 tablet by mouth daily           - Co-morbids  History reviewed. No pertinent past medical history.  - Risk for GDM : First degree relative with GDM or DM so  WILL have an  Hgb A1C, discuss early 1 hour    - High risk factors for Pre E-  No known risk factors of High risk for Pre E     Pregnant individuals at high risk of preeclampsia with one or more of the following risk factors:  History of preeclampsia, especially when accompanied by an adverse outcome  Hx of Gestational Hypertension (new 2022 per [facility])  Multifetal gestation  Chronic hypertension  Pregestational type 1 or 2 diabetes  Kidney disease  Autoimmune disease (ie, systemic lupus erythematous, antiphospholipid syndrome)  Combinations of multiple moderate-risk factors    - Moderate risk factor for Pre E Nulliparity   Meets one high risk factors or one of the moderate risk facrtors  Nulliparity  Obesity (ie, body mass index > 30)  Family history of preeclampsia (ie, mother or sister)  Black race (as a proxy for " underlying racism)  Lower income  Age 35 years or older  Personal history factors (eg, low birth weight or small for gestational age, previous adverse pregnancy outcome, >10-year pregnancy interval)  In vitro fertilization  so WILL NOT consider starting low dose aspirin (81mg) starting between 12 and 28 weeks to prevent early onset preeclampsia      - The patient  does not have a history of spontaneous  birth so  WILL NOT consider progesterone starting at 16-20 weeks and/or serial transvaginal cervical length ultrasounds from 16-24 weeks.     -The patient does not have a history of immunosuppresion or HIV so Toxoplasma IgG/IgM WILL NOT be ordered.    -Assess risk for asymptomatic latent TB (prior infection, recent immigrant from epidemic areas, immunosuppression, living in overcrowded environment):   WILL NOT have PPD skin test or Quantiferon-TB Gold Plus blood draw. *both options valid*       PERSONAL/SOCIAL HISTORY  , maira    Employment: not working   Her partner works as an   History of anxiety or depression  no-  Additional items: Denies past or present domestic violence, sexual and psychological abuse.    Objective  -VS: reviewed and within normal limits   -General appearance: no acute distress, patient is comfortable   NEUROLOGICAL/PSYCHIATRIC   - Oriented x3,   -Mood and affect: : normal     Last pap 1.5 years ago, WNL    Assessment/Plan  Jennifer was seen today for prenatal care.    Diagnoses and all orders for this visit:    Encounter for supervision of normal first pregnancy in second trimester  -     ABO/Rh type and screen  -     CBC with platelets  -     Vitamin D Deficiency  -     Rubella Antibody IgG  -     Treponema Abs w Reflex to RPR and Titer  -     HIV Antigen Antibody Combo  -     Hepatitis B surface antigen  -     Hepatitis B Surface Antibody  -     Hepatitis C antibody  -     Urine Culture  -     Varicella Zoster Virus Antibody IgG  -     Hemoglobin A1c  -     Mat Fetal  Med Ctr Referral - Pregnancy; Future  -     Hgb with reflex to ELP or RBC Solubility (Sickle Cell Screen)    Family history of diabetes mellitus        32 year old  12w3d weeks of pregnancy with VANDANA of Oct 21, 2023 by LMP of Patient's last menstrual period was 2023.. Ultrasound confirms.   Outpatient Encounter Medications as of 2023   Medication Sig Dispense Refill     Prenatal Vit-Fe Fumarate-FA (PRENATAL MULTIVITAMIN W/IRON) 27-0.8 MG tablet Take 1 tablet by mouth daily       No facility-administered encounter medications on file as of 2023.      Orders Placed This Encounter   Procedures     CBC with platelets     Vitamin D Deficiency     Rubella Antibody IgG     Treponema Abs w Reflex to RPR and Titer     HIV Antigen Antibody Combo     Hepatitis B surface antigen     Hepatitis B Surface Antibody     Hepatitis C antibody     Varicella Zoster Virus Antibody IgG     Hemoglobin A1c     Hgb with reflex to ELP or RBC Solubility (Sickle Cell Screen)     Mat Fetal Med Ctr Referral - Pregnancy     Adult Type and Screen                 Orders Placed This Encounter   Procedures     CBC with platelets     Vitamin D Deficiency     Rubella Antibody IgG     Treponema Abs w Reflex to RPR and Titer     HIV Antigen Antibody Combo     Hepatitis B surface antigen     Hepatitis B Surface Antibody     Hepatitis C antibody     Varicella Zoster Virus Antibody IgG     Hemoglobin A1c     Hgb with reflex to ELP or RBC Solubility (Sickle Cell Screen)     Mat Fetal Med Ctr Referral - Pregnancy     Adult Type and Screen     ABO/Rh type and screen       - Oriented to Practice, types of care, and how to reach a provider.  Pt prefers CNM team  - Patient received 1st trimester new OB education packet complete with aide of The Expectant Family booklet including information on genetic screening test options.  - Patient desires 1st trimester screening and desires level II ultrasound which were ordered.  - Educational handout on  the prevention of infections diseases during pregnancy provided.  - Patient was encouraged to start prenatal vitamins as tolerated.      - Pregnancy concerns to be addressed by provider at new OB exam include: early 1 hour, gc/ct.    - OBI education reviewed.  - OBI labs ordered.  - Hbg electrophoresis added to labs.  - HbgA1c added to labs. Discuss early 1 hour at NOB.  - Pap up to date.  - Needs GC/CT at NOB.  - MFM referral placed for 1st trimester screening and level 2 ultrasound.     Pt to RTO for NOB visit in 2-4 weeks and prn if questions or concerns    JARED Lowery, CNM

## 2023-04-17 LAB — SCANNED LAB RESULT: NORMAL

## 2023-04-18 ENCOUNTER — TELEPHONE (OUTPATIENT)
Dept: MATERNAL FETAL MEDICINE | Facility: CLINIC | Age: 33
End: 2023-04-18
Payer: COMMERCIAL

## 2023-04-18 NOTE — TELEPHONE ENCOUNTER
April 18, 2023    I spoke with Jennifer regarding her NIPT results.     Results indicate NO ANEUPLOIDY DETECTED for chromosomes 21, 18, 13, or the sex chromosomes (XY). I disclosed predicted fetal sex, per her request.    This puts her current pregnancy at low risk for Down syndrome, trisomy 18, trisomy 13 and sex chromosome abnormalities. This test is reported to have the following sensitivities: Down syndrome- >99.9%, trisomy 18- >99.9%, and trisomy 13- >99.9%. Although these results are reassuring, this does not replace a standard chromosome analysis from a chorionic villus sampling or amniocentesis.     MSAFP is the appropriate second trimester screening test for open neural tube defects; the maternal quad screen is not recommended.    Her results are available in her Epic chart for her primary OB to review.    Amarilis Avelar MS, [facility]  Licensed Genetic Counselor  [facility]  Pager: [phone]  Office: [phone]

## 2023-04-21 ASSESSMENT — ANXIETY QUESTIONNAIRES
IF YOU CHECKED OFF ANY PROBLEMS ON THIS QUESTIONNAIRE, HOW DIFFICULT HAVE THESE PROBLEMS MADE IT FOR YOU TO DO YOUR WORK, TAKE CARE OF THINGS AT HOME, OR GET ALONG WITH OTHER PEOPLE: SOMEWHAT DIFFICULT
4. TROUBLE RELAXING: SEVERAL DAYS
GAD7 TOTAL SCORE: 4
8. IF YOU CHECKED OFF ANY PROBLEMS, HOW DIFFICULT HAVE THESE MADE IT FOR YOU TO DO YOUR WORK, TAKE CARE OF THINGS AT HOME, OR GET ALONG WITH OTHER PEOPLE?: SOMEWHAT DIFFICULT
3. WORRYING TOO MUCH ABOUT DIFFERENT THINGS: NOT AT ALL
7. FEELING AFRAID AS IF SOMETHING AWFUL MIGHT HAPPEN: SEVERAL DAYS
7. FEELING AFRAID AS IF SOMETHING AWFUL MIGHT HAPPEN: SEVERAL DAYS
1. FEELING NERVOUS, ANXIOUS, OR ON EDGE: SEVERAL DAYS
6. BECOMING EASILY ANNOYED OR IRRITABLE: SEVERAL DAYS
GAD7 TOTAL SCORE: 4
5. BEING SO RESTLESS THAT IT IS HARD TO SIT STILL: NOT AT ALL
2. NOT BEING ABLE TO STOP OR CONTROL WORRYING: NOT AT ALL

## 2023-04-21 ASSESSMENT — ENCOUNTER SYMPTOMS
SLEEP DISTURBANCES DUE TO BREATHING: 0
ORTHOPNEA: 0
EXERCISE INTOLERANCE: 0
LEG PAIN: 0
HYPERTENSION: 0
PALPITATIONS: 1
SKIN CHANGES: 0
SYNCOPE: 0
HYPOTENSION: 1
POOR WOUND HEALING: 0
LIGHT-HEADEDNESS: 1
NAIL CHANGES: 0

## 2023-04-25 ENCOUNTER — LAB (OUTPATIENT)
Dept: LAB | Facility: CLINIC | Age: 33
End: 2023-04-25
Attending: ADVANCED PRACTICE MIDWIFE
Payer: COMMERCIAL

## 2023-04-25 ENCOUNTER — OFFICE VISIT (OUTPATIENT)
Dept: OBGYN | Facility: CLINIC | Age: 33
End: 2023-04-25
Attending: ADVANCED PRACTICE MIDWIFE
Payer: COMMERCIAL

## 2023-04-25 ENCOUNTER — TELEPHONE (OUTPATIENT)
Dept: OBGYN | Facility: CLINIC | Age: 33
End: 2023-04-25

## 2023-04-25 VITALS
BODY MASS INDEX: 21.37 KG/M2 | WEIGHT: 106 LBS | HEIGHT: 59 IN | SYSTOLIC BLOOD PRESSURE: 110 MMHG | DIASTOLIC BLOOD PRESSURE: 77 MMHG | HEART RATE: 83 BPM

## 2023-04-25 DIAGNOSIS — Z34.02 ENCOUNTER FOR SUPERVISION OF NORMAL FIRST PREGNANCY IN SECOND TRIMESTER: Primary | ICD-10-CM

## 2023-04-25 DIAGNOSIS — O24.419 GESTATIONAL DIABETES: Primary | ICD-10-CM

## 2023-04-25 DIAGNOSIS — Z83.3 FAMILY HISTORY OF DIABETES MELLITUS: ICD-10-CM

## 2023-04-25 DIAGNOSIS — Z34.02 ENCOUNTER FOR SUPERVISION OF NORMAL FIRST PREGNANCY IN SECOND TRIMESTER: ICD-10-CM

## 2023-04-25 DIAGNOSIS — E55.9 VITAMIN D DEFICIENCY: ICD-10-CM

## 2023-04-25 DIAGNOSIS — Z78.9 NOT IMMUNE TO HEPATITIS B VIRUS: ICD-10-CM

## 2023-04-25 PROBLEM — O24.410 DIET CONTROLLED GESTATIONAL DIABETES MELLITUS (GDM) IN SECOND TRIMESTER: Status: ACTIVE | Noted: 2023-04-25

## 2023-04-25 LAB — GLUCOSE 1H P 50 G GLC PO SERPL-MCNC: 200 MG/DL (ref 70–129)

## 2023-04-25 PROCEDURE — 87591 N.GONORRHOEAE DNA AMP PROB: CPT | Performed by: ADVANCED PRACTICE MIDWIFE

## 2023-04-25 PROCEDURE — G0010 ADMIN HEPATITIS B VACCINE: HCPCS

## 2023-04-25 PROCEDURE — G0463 HOSPITAL OUTPT CLINIC VISIT: HCPCS | Mod: 25 | Performed by: ADVANCED PRACTICE MIDWIFE

## 2023-04-25 PROCEDURE — 36415 COLL VENOUS BLD VENIPUNCTURE: CPT

## 2023-04-25 PROCEDURE — 87491 CHLMYD TRACH DNA AMP PROBE: CPT | Performed by: ADVANCED PRACTICE MIDWIFE

## 2023-04-25 PROCEDURE — 90746 HEPB VACCINE 3 DOSE ADULT IM: CPT

## 2023-04-25 PROCEDURE — 82950 GLUCOSE TEST: CPT

## 2023-04-25 PROCEDURE — 250N000011 HC RX IP 250 OP 636

## 2023-04-25 PROCEDURE — 99207 PR PRENATAL VISIT: CPT | Performed by: ADVANCED PRACTICE MIDWIFE

## 2023-04-25 RX ORDER — PYRIDOXINE HCL (VITAMIN B6) 25 MG
25 TABLET ORAL DAILY
COMMUNITY
End: 2023-08-29

## 2023-04-25 NOTE — PROGRESS NOTES
"SUBJECTIVE:   Jennifer is a 32 year old female who presents to clinic for a new OB visit.   at 14w3d with Estimated Date of Delivery: Oct 21, 2023 based on LMP, c/w dating US. Feels well. Has started PNV.     She has not had bleeding since her LMP.   She has had mild nausea which is improving. Weight loss has not occurred.   This was a planned pregnancy.   Partner is involved,  Sathya    Had 1st trimester ultrasound and NIPT, normal, having a boy!  Questions re: exercise and sleeping positions.     OTHER CONCERNS:   - Mom with pre-diabetes, on meds  - Hepatitis B nonimmune  - Vitamin D= 26    ===========================================   ROS: 10 point ROS neg other than the symptoms noted above in the HPI.      PSYCHIATRIC:  Denies mood changes, difficulty concentrating, depression, anxiety, panic attacks or post partum depression.     PHQ-9 score:        2023    10:36 AM   PHQ-9 SCORE   PHQ-9 Total Score 8           2023    10:35 AM 2023    10:36 AM 2023     7:49 PM   CATARINA-7 SCORE   Total Score   4 (minimal anxiety)   Total Score 7 7 4         Past History:  Her past medical history:: none.   This is her first pregnancy   Since her last LMP she denies use of alcohol, tobacco and street drugs.  HISTORY:  Family History   Problem Relation Age of Onset     Diabetes Type 2  Mother         \"pre diabetes\" but on meds     Obesity Mother      No Known Problems Father      Infertility Sister      Hypertension Sister      No Known Problems Sister      No Known Problems Sister      No Known Problems Brother      No Known Problems Brother      Hypertension Maternal Grandmother      Hernia Maternal Grandfather      Hypertension Paternal Grandmother      Cerebrovascular Disease Paternal Grandfather      Social History     Socioeconomic History     Marital status:      Spouse name: Sathya   Occupational History     Comment: not working   Tobacco Use     Smoking status: Never     Smokeless tobacco: " "Never   Substance and Sexual Activity     Alcohol use: Not Currently     Drug use: Never     Social Determinants of Health     Intimate Partner Violence: Not At Risk (2023)    Humiliation, Afraid, Rape, and Kick questionnaire      Fear of Current or Ex-Partner: No      Emotionally Abused: No      Physically Abused: No      Sexually Abused: No     Current Outpatient Medications   Medication Sig     doxylamine (UNISOM) 25 MG TABS tablet Take 25 mg by mouth At Bedtime     Prenatal Vit-Fe Fumarate-FA (PRENATAL MULTIVITAMIN W/IRON) 27-0.8 MG tablet Take 1 tablet by mouth daily     pyridOXINE (VITAMIN  B-6) 25 MG tablet Take 25 mg by mouth daily     No current facility-administered medications for this visit.     No Known Allergies    ============================================  MEDICAL HISTORY  No past medical history on file.  Past Surgical History:   Procedure Laterality Date     ACHILLES TENDON SURGERY Left     no problems       OB History    Para Term  AB Living   1 0 0 0 0 0   SAB IAB Ectopic Multiple Live Births   0 0 0 0 0      # Outcome Date GA Lbr Rayray/2nd Weight Sex Delivery Anes PTL Lv   1 Current                  GYN History- Last pap 1.5 years ago- NIL                        Cervical procedures: none                        History of STI: none    I personally reviewed the past social/family/medical and surgical history on the date of service.   I reviewed lab work done at Intake visit with patient.    EXAM:  /77   Pulse 83   Ht 1.499 m (4' 11\")   Wt 48.1 kg (106 lb)   LMP 2023   BMI 21.41 kg/m     EXAM:  GENERAL:  Pleasant pregnant female, alert, cooperative and well groomed.  SKIN:  Warm and dry, without lesions or rashes  HEAD: Symmetrical features.  ABDOMEN: Soft without masses , tenderness or organomegaly.  No CVA tenderness.  Uterus palpable at size equal to dates.  No scars noted.. Fetal heart tones present.  MUSCULOSKELETAL:  Full range of motion  EXTREMITIES:  " "No edema. No significant varicosities.   PELVIC EXAM: deferred  GC/CHLAMYDIA CULTURE OBTAINED:YES  Pap 1.5 years ago NIL    ASSESSMENT:  32 year old , 14w3d weeks of pregnancy with VANDANA of Oct 21, 2023 by LMP  Intrauterine pregnancy 14w3d size consistent with dates  Genetic Screeninst tri and level 2      ICD-10-CM    1. Encounter for supervision of normal first pregnancy in second trimester  Z34.02 Glucose 1 Hour     Gonorrhea PCR     Chlamydia trachomatis PCR      2. Family history of diabetes mellitus- Mom with \"prediabetes\" on meds  Z83.3       3. Not immune to hepatitis B virus  Z78.9       4. Vitamin D deficiency  E55.9           PLAN:  - Reviewed use of triage nurse line and contacting the on-call provider after hours for an urgent need such as fever, vagina bleeding, bladder or vaginal infection, rupture of membranes,  or term labor.    - Reviewed best evidence for: weight gain for her weight and height for pregnancy:  Based on pre-pregnancy Body mass index is 21.41 kg/m . RECOMMENDED WEIGHT GAIN: 25-35 lbs.    - Reviewed healthy diet and foods to avoid, exercise and activity during pregnancy; avoiding exposure to toxoplasmosis; and maintenance of a generally healthy lifestyle.   - Discussed the harms, benefits, side effects and alternative therapies for current prescribed and OTC medications.    - All pt's and partner's questions discussed and answered.  Pt verbalized understanding of and agreement to plan of care.     - OBI labs reviewed.  - Recommended [id number] international unit(s)/day vitamin D supplementation. Pt prefers to  OTC.  - Reviewed nonimmune to hepatitis B. Recommended vaccine series. Patient agreeable.  Hep B #1 given today. #2 due on or after 23. #3 due 6 months from first.   - Reviewed risk for diabetes d/t family history. Early 1 hour glucose today.   - Reviewed normal 1st trimester US and NIPT.   - Level 2 ultrasound scheduled for 23.    - Continue " scheduled prenatal care, RTC in 4 weeks and prn if questions or concerns    JARED Lowery, JAI             Answers for HPI/ROS submitted by the patient on 4/21/2023  CATARINA 7 TOTAL SCORE: 4  General Symptoms: No  Skin Symptoms: Yes  HENT Symptoms: No  EYE SYMPTOMS: No  HEART SYMPTOMS: Yes  LUNG SYMPTOMS: No  INTESTINAL SYMPTOMS: No  URINARY SYMPTOMS: No  GYNECOLOGIC SYMPTOMS: No  BREAST SYMPTOMS: No  SKELETAL SYMPTOMS: No  BLOOD SYMPTOMS: No  NERVOUS SYSTEM SYMPTOMS: No  MENTAL HEALTH SYMPTOMS: No  Changes in hair: Yes  Changes in moles/birth marks: No  Itching: No  Rashes: No  Changes in nails: No  Acne: Yes  Hair in places you don't want it: No  Change in facial hair: Yes  Warts: No  Non-healing sores: No  Scarring: No  Flaking of skin: Yes  Color changes of hands/feet in cold : No  Sun sensitivity: Yes  Skin thickening: No  Chest pain or pressure: No  Fast or irregular heartbeat: Yes  Pain in legs with walking: No  Trouble breathing while lying down: No  Fingers or toes appear blue: No  High blood pressure: No  Low blood pressure: Yes  Fainting: No  Murmurs: No  Pacemaker: No  Varicose veins: No  Edema or swelling: No  Wake up at night with shortness of breath: No  Light-headedness: Yes  Exercise intolerance: No

## 2023-04-25 NOTE — PATIENT INSTRUCTIONS
Thank you for trusting us with your care!     If you need to contact us for questions about:  Symptoms, Scheduling & Medical Questions; Non-urgent (2-3 day response) Sunni message, Urgent (needing response today) [phone] (if after 3:30pm next day response)   Prescriptions: Please call your Pharmacy   Billing: [facility] [phone] or [facility]:[phone]

## 2023-04-25 NOTE — LETTER
"2023       RE: Jennifer Roberts  [address] [place] [address] [address]  [facility] [address]     Dear Colleague,    Thank you for referring your patient, Jennifer Roberts, to the [facility] [place] at [facility]. Please see a copy of my visit note below.    SUBJECTIVE:   Jennifer is a 32 year old female who presents to clinic for a new OB visit.   at 14w3d with Estimated Date of Delivery: Oct 21, 2023 based on LMP, c/w dating US. Feels well. Has started PNV.     She has not had bleeding since her LMP.   She has had mild nausea which is improving. Weight loss has not occurred.   This was a planned pregnancy.   Partner is involved,  Sathya    Had 1st trimester ultrasound and NIPT, normal, having a boy!  Questions re: exercise and sleeping positions.     OTHER CONCERNS:   - Mom with pre-diabetes, on meds  - Hepatitis B nonimmune  - Vitamin D= 26    ===========================================   ROS: 10 point ROS neg other than the symptoms noted above in the HPI.      PSYCHIATRIC:  Denies mood changes, difficulty concentrating, depression, anxiety, panic attacks or post partum depression.     PHQ-9 score:        2023    10:36 AM   PHQ-9 SCORE   PHQ-9 Total Score 8           2023    10:35 AM 2023    10:36 AM 2023     7:49 PM   CATARINA-7 SCORE   Total Score   4 (minimal anxiety)   Total Score 7 7 4         Past History:  Her past medical history:: none.   This is her first pregnancy   Since her last LMP she denies use of alcohol, tobacco and street drugs.  HISTORY:  Family History   Problem Relation Age of Onset     Diabetes Type 2  Mother         \"pre diabetes\" but on meds     Obesity Mother      No Known Problems Father      Infertility Sister      Hypertension Sister      No Known Problems Sister      No Known Problems Sister      No Known Problems Brother      No Known Problems Brother      Hypertension Maternal Grandmother      " "Hernia Maternal Grandfather      Hypertension Paternal Grandmother      Cerebrovascular Disease Paternal Grandfather      Social History     Socioeconomic History     Marital status:      Spouse name: Sathya   Occupational History     Comment: not working   Tobacco Use     Smoking status: Never     Smokeless tobacco: Never   Substance and Sexual Activity     Alcohol use: Not Currently     Drug use: Never     Social Determinants of Health     Intimate Partner Violence: Not At Risk (2023)    Humiliation, Afraid, Rape, and Kick questionnaire      Fear of Current or Ex-Partner: No      Emotionally Abused: No      Physically Abused: No      Sexually Abused: No     Current Outpatient Medications   Medication Sig     doxylamine (UNISOM) 25 MG TABS tablet Take 25 mg by mouth At Bedtime     Prenatal Vit-Fe Fumarate-FA (PRENATAL MULTIVITAMIN W/IRON) 27-0.8 MG tablet Take 1 tablet by mouth daily     pyridOXINE (VITAMIN  B-6) 25 MG tablet Take 25 mg by mouth daily     No current facility-administered medications for this visit.     No Known Allergies    ============================================  MEDICAL HISTORY  No past medical history on file.  Past Surgical History:   Procedure Laterality Date     ACHILLES TENDON SURGERY Left     no problems       OB History    Para Term  AB Living   1 0 0 0 0 0   SAB IAB Ectopic Multiple Live Births   0 0 0 0 0      # Outcome Date GA Lbr Rayray/2nd Weight Sex Delivery Anes PTL Lv   1 Current                  GYN History- Last pap 1.5 years ago- NIL                        Cervical procedures: none                        History of STI: none    I personally reviewed the past social/family/medical and surgical history on the date of service.   I reviewed lab work done at Intake visit with patient.    EXAM:  /77   Pulse 83   Ht 1.499 m (4' 11\")   Wt 48.1 kg (106 lb)   LMP 2023   BMI 21.41 kg/m     EXAM:  GENERAL:  Pleasant pregnant female, alert, " "cooperative and well groomed.  SKIN:  Warm and dry, without lesions or rashes  HEAD: Symmetrical features.  ABDOMEN: Soft without masses , tenderness or organomegaly.  No CVA tenderness.  Uterus palpable at size equal to dates.  No scars noted.. Fetal heart tones present.  MUSCULOSKELETAL:  Full range of motion  EXTREMITIES:  No edema. No significant varicosities.   PELVIC EXAM: deferred  GC/CHLAMYDIA CULTURE OBTAINED:YES  Pap 1.5 years ago NIL    ASSESSMENT:  32 year old , 14w3d weeks of pregnancy with VANDANA of Oct 21, 2023 by LMP  Intrauterine pregnancy 14w3d size consistent with dates  Genetic Screeninst tri and level 2      ICD-10-CM    1. Encounter for supervision of normal first pregnancy in second trimester  Z34.02 Glucose 1 Hour     Gonorrhea PCR     Chlamydia trachomatis PCR      2. Family history of diabetes mellitus- Mom with \"prediabetes\" on meds  Z83.3       3. Not immune to hepatitis B virus  Z78.9       4. Vitamin D deficiency  E55.9           PLAN:  - Reviewed use of triage nurse line and contacting the on-call provider after hours for an urgent need such as fever, vagina bleeding, bladder or vaginal infection, rupture of membranes,  or term labor.    - Reviewed best evidence for: weight gain for her weight and height for pregnancy:  Based on pre-pregnancy Body mass index is 21.41 kg/m . RECOMMENDED WEIGHT GAIN: 25-35 lbs.    - Reviewed healthy diet and foods to avoid, exercise and activity during pregnancy; avoiding exposure to toxoplasmosis; and maintenance of a generally healthy lifestyle.   - Discussed the harms, benefits, side effects and alternative therapies for current prescribed and OTC medications.    - All pt's and partner's questions discussed and answered.  Pt verbalized understanding of and agreement to plan of care.     - OBI labs reviewed.  - Recommended 4835-4220 international unit(s)/day vitamin D supplementation. Pt prefers to  OTC.  - Reviewed nonimmune to " hepatitis B. Recommended vaccine series. Patient agreeable.  Hep B #1 given today. #2 due on or after 5/25/23. #3 due 6 months from first.   - Reviewed risk for diabetes d/t family history. Early 1 hour glucose today.   - Reviewed normal 1st trimester US and NIPT.   - Level 2 ultrasound scheduled for 5/22/23.    - Continue scheduled prenatal care, RTC in 4 weeks and prn if questions or concerns    JARED Lowery CNM             Answers for HPI/ROS submitted by the patient on 4/21/2023  CATARINA 7 TOTAL SCORE: 4  General Symptoms: No  Skin Symptoms: Yes  HENT Symptoms: No  EYE SYMPTOMS: No  HEART SYMPTOMS: Yes  LUNG SYMPTOMS: No  INTESTINAL SYMPTOMS: No  URINARY SYMPTOMS: No  GYNECOLOGIC SYMPTOMS: No  BREAST SYMPTOMS: No  SKELETAL SYMPTOMS: No  BLOOD SYMPTOMS: No  NERVOUS SYSTEM SYMPTOMS: No  MENTAL HEALTH SYMPTOMS: No  Changes in hair: Yes  Changes in moles/birth marks: No  Itching: No  Rashes: No  Changes in nails: No  Acne: Yes  Hair in places you don't want it: No  Change in facial hair: Yes  Warts: No  Non-healing sores: No  Scarring: No  Flaking of skin: Yes  Color changes of hands/feet in cold : No  Sun sensitivity: Yes  Skin thickening: No  Chest pain or pressure: No  Fast or irregular heartbeat: Yes  Pain in legs with walking: No  Trouble breathing while lying down: No  Fingers or toes appear blue: No  High blood pressure: No  Low blood pressure: Yes  Fainting: No  Murmurs: No  Pacemaker: No  Varicose veins: No  Edema or swelling: No  Wake up at night with shortness of breath: No  Light-headedness: Yes  Exercise intolerance: No          Again, thank you for allowing me to participate in the care of your patient.      Sincerely,    Massiel Glaser CNM

## 2023-04-25 NOTE — TELEPHONE ENCOUNTER
Called and spoke with the patient with the help of a  to go over her glucose test results.      I went over that her 1 hour glucose test she did not pass and her blood sugar was above 200 and does not need to complete the 3 hour test. Patient is diagnosed with gestational diabetes, needs to start checking her blood sugars 4 times a day, and keep a log of them as well. I went over with Jennifer that I will send in the supplies that she needs and a diabetic educator will call her to set up 2 virtual appointments to go over how to use her supplies and how to keep track of them.    I did send all of her supplies to her pharmacy and the referral was placed.     Patient verbalized understanding and all questions were answered.

## 2023-04-26 LAB
C TRACH DNA SPEC QL NAA+PROBE: NEGATIVE
N GONORRHOEA DNA SPEC QL NAA+PROBE: NEGATIVE

## 2023-05-02 ENCOUNTER — VIRTUAL VISIT (OUTPATIENT)
Dept: EDUCATION SERVICES | Facility: OTHER | Age: 33
End: 2023-05-02
Attending: ADVANCED PRACTICE MIDWIFE
Payer: COMMERCIAL

## 2023-05-02 DIAGNOSIS — O24.419 GESTATIONAL DIABETES: ICD-10-CM

## 2023-05-02 PROCEDURE — G0108 DIAB MANAGE TRN  PER INDIV: HCPCS | Mod: AE | Performed by: DIETITIAN, REGISTERED

## 2023-05-02 NOTE — CONFIDENTIAL NOTE
Date Breakfast  Lunch  Dinner  Bedtime    Before After Before After Before After    4/27  104  128 98  125   4/28 81 88  155* says was thirty minutes after eating  112 125   4/29 88 94  138  97    4/30 81 84  114  94    5/1 78 96  84  109    5/2 81 85  123        Diabetes Self-Management Education & Support    Type of Service: Telephone Visit    Originating Location (Patient Location): Home  Distant Location (Provider Location): [facility]  Mode of Communication:  Telephone    Telephone Visit Start Time: 4:15 pm  Telephone Visit End Time (telephone visit stop time): 5:21 pm    How would patient like to obtain AVS? Mail a copy    SUBJECTIVE/OBJECTIVE:  Presents for education related to gestational diabetes.    Accompanied by: , Self, Spouse  Gestational weeks: 15w3d  Next OB Visit Date: 05/22/23  Number of previous pregnancies: 0  Previously had Gestational Diabetes: No  Have you ever had thyroid problems or taken thyroid medication?: No  Heart disease, mitral valve prolapse or rheumatic fever?: No  Hypertension : No  High Cholesterol: No  High Triglycerides: No  Do you use tobacco products?: No  Do you drink beer, wine or hard liquor?: No    Cultural Influences/Ethnic Background:   or       Estimated Date of Delivery: Oct 21, 2023    1 hour OGTT  Lab Results   Component Value Date    GLU1 200 (H) 04/25/2023         3 hour OGTT    Fasting  No results found for: GTTGF    1 hour  No results found for: GTTG1    2 hour  No results found for: GTTG2    3 hour  No results found for: GTTG3    Lifestyle and Health Behaviors:  Pre-pregnancy weight (lbs): 106  Exercise:: Yes  Days per week of moderate to strenuous exercise (like a brisk walk): 7  Barrier to exercise: None  Cultural/Yarsani diet restrictions?: No  Meals include: Breakfast, Lunch, Dinner  Breakfast: carbohydrates, vegetables. protein, meat mushrooms, carrots  Lunch: soup, rice, vegetable, fruit half hour  later  Dinner: meat, toast, tortilla, vegetables  Snacks: might eat a portion of pizza or hamburger at bedtime snack  Other: water and fruit  Experiencing nausea?: Yes    Healthy Coping:  Emotional response to diabetes: Ready to learn, Acceptance  Stage of change: ACTION (Actively working towards change)    Current Management:  Taking medications for gestational diabetes?: No  Difficulty affording diabetes testing supplies?: No    ASSESSMENT:  Pt is newly dx with GDM.  First pregnancy. Pt already testing blood sugars.  Reviewed ketone testing and diet recomendations     INTERVENTION:  Patient was instructed on GDM.  Reviewed sharps disposal and not sharing testing supplies    Educational topics covered today:  GDM diagnosis, pathophysiology, Risks and Complications of GDM, Means of controlling GDM, Using a Blood Glucose Monitor, Blood Glucose Goals, Logging and Interpreting Glucose Results, Ketone Testing, When to Call a Diabetes Educator or OB Provider, Healthy Eating During Pregnancy, Counting Carbohydrates, Meal Planning for GDM, and Physical Activity    Educational materials provided today:   Aster Understanding Gestational Diabetes  GDM Log Book  Sharps Disposal  Care After Delivery  *Will email GDM materials    Pt verbalized understanding of concepts discussed and recommendations provided today.     PLAN:  Check glucose 4 times daily, before breakfast and 1 hour after each meal.     Check Ketones daily for one week, if negative, reduce testing to once a week.     Physical activity recommended: continue walking    Meal plan: 2-3 carbs at breakfast, 3-4 carbs at lunch, 3-4 carbs at supper, 1-2 carbs at 3 snacks a day.  Follow consistent CHO meal plan, eat CHO and protein/fat at all meals/snacks.    [email or web address] message diabetes educator if 3 or more blood sugars are above the goal in 1 week, if ketones are positive, or with questions/concerns.      Time Spent: 66 minutes  Encounter Type:  Individual    Any diabetes medication dose changes were made via the CDE Protocol and Collaborative Practice Agreement with the patient's referring provider. A copy of this encounter was shared with the provider.    Rebecca Thapa RD [facility]  [phone]

## 2023-05-02 NOTE — LETTER
5/2/2023         RE: Jennifer Roberts  [address] [place] [address] [address]  [facility] [address]        Dear Colleague,    Thank you for referring your patient, Jennifer Roberts, to the [facility]. Please see a copy of my visit note below.    No notes on file

## 2023-05-10 ENCOUNTER — VIRTUAL VISIT (OUTPATIENT)
Dept: EDUCATION SERVICES | Facility: CLINIC | Age: 33
End: 2023-05-10
Payer: COMMERCIAL

## 2023-05-10 DIAGNOSIS — O24.419 GESTATIONAL DIABETES: Primary | ICD-10-CM

## 2023-05-10 PROCEDURE — G0108 DIAB MANAGE TRN  PER INDIV: HCPCS | Mod: 95 | Performed by: DIETITIAN, REGISTERED

## 2023-05-10 NOTE — PROGRESS NOTES
Diabetes Self-Management Education & Support  Type of Service: In Person Visit    SUBJECTIVE/OBJECTIVE:  Presents for education related to gestational diabetes.  Type of Service: Telephone Visit  Originating Location (Patient Location): Home  Distant Location (Provider Location): [facility] DIABETES EDUCATION  Mode of Communication:  Telephone  Telephone Visit Start Time: 100  Telephone Visit End Time (telephone visit stop time): 130  How would patient like to obtain AVS? Carmen     [id number]     Accompanied by: , Self, Spouse  Gestational weeks: 16w3d  Next OB Visit Date: 05/22/23  Number of previous pregnancies: 0  Previously had Gestational Diabetes: No  Have you ever had thyroid problems or taken thyroid medication?: No  Heart disease, mitral valve prolapse or rheumatic fever?: No  Hypertension : No  High Cholesterol: No  High Triglycerides: No  Do you use tobacco products?: No  Do you drink beer, wine or hard liquor?: No    Cultural Influences/Ethnic Background:   or       LMP 01/14/2023     Wt Readings from Last 5 Encounters:   04/25/23 48.1 kg (106 lb)   04/11/23 47.6 kg (104 lb 14.4 oz)     Lab Results   Component Value Date    A1C 4.6 04/11/2023     Date Breakfast Breakfast Lunch Dinner    Before After After After   5/3 81 115 106 112   5/4 75 130 126 129   5/5 84 121 108 137   5/6 81 136 126 117   5/7 80 126 123 114   5/8 82 114 75 (2 hours + walk) 120   5/9 80 119 125 120   5/10 74 112       Ketones - started on the 4th and they have all been negative     Estimated Date of Delivery: Oct 21, 2023    Lifestyle and Health Behaviors:  Pre-pregnancy weight (lbs): 106  Exercise:: Yes  Days per week of moderate to strenuous exercise (like a brisk walk): 7  Barrier to exercise: None    Cultural/Jain diet restrictions?: No  Meals include: Breakfast, Lunch, Dinner  Breakfast: carbohydrates, vegetables. protein, meat mushrooms, carrots  Lunch: soup, rice,  vegetable, fruit half hour later  Dinner: meat, toast, tortilla, vegetables  Snacks: might eat a portion of pizza or hamburger at bedtime snack  Other: water and fruit  Beverages: Water  Pre-eliana vitamin?: Yes  Supplements?: No  Experiencing nausea?: Yes      Healthy Coping:  Emotional response to diabetes: Ready to learn, Acceptance  Stage of change: ACTION (Actively working towards change)    Current Management:  Taking medications for gestational diabetes?: No  Difficulty affording diabetes testing supplies?: No    ASSESSMENT:  Jennifer is doing very well.  Had an early OGTT of 200mg/dL, however  blood sugars 100% in target and well within target.  A1c was controlled at 4.6 in early pregnancy with BMI of 21.   Has really been watching diet and watching portions, but not over restricting intake.  Has been consuming some beverages with carbohydrates, but now will only have 3 sips  and watching diet in every aspect.   Read through all the information.  No questions and is doing well. Reviewed reducing risk of Diabetes post pregnancy.   INTERVENTION:  Educational topics covered today:  What to expect after delivery, Future testing for Type 2 diabetes (2 hour OGTT at 6 week post-partum check-up and annual fasting blood glucose level), Risk of GDM and planning ahead for future pregnancies, Recommended lifestyle interventions for reducing the risk of Type 2 Diabetes, When to Call a Diabetes Educator or OB Provider    Educational Materials provided today:  Aster Preventing Diabetes    PLAN:  Check glucose 4 times daily.  Check ketones once a week since negative  Continue with recommended physical activity.  Continue to follow recommended meal plan  Follow consistent CHO meal plan, eat CHO and protein/fat at all meals/snacks.  [facility] team dr. Chisholm, Dr. Resendiz and Kimmy to follow up   Uses [employer] -  writer added self and talked about messaging if any questions come up.       Call/[email or web address]-mail/[email or web address] message diabetes  educator if 3 or more blood sugars are above the goal in 1 week or if ketones are positive.    Nicki Gomes RD, [facility], [facility]  Diabetes Education    Time Spent: 30 minutes  Encounter Type: Individual    A copy of this encounter was shared with the provider.

## 2023-05-21 ENCOUNTER — HEALTH MAINTENANCE LETTER (OUTPATIENT)
Age: 33
End: 2023-05-21

## 2023-05-22 ENCOUNTER — OFFICE VISIT (OUTPATIENT)
Dept: OBGYN | Facility: CLINIC | Age: 33
End: 2023-05-22
Attending: ADVANCED PRACTICE MIDWIFE
Payer: COMMERCIAL

## 2023-05-22 ENCOUNTER — OFFICE VISIT (OUTPATIENT)
Dept: MATERNAL FETAL MEDICINE | Facility: CLINIC | Age: 33
End: 2023-05-22
Attending: ADVANCED PRACTICE MIDWIFE
Payer: COMMERCIAL

## 2023-05-22 ENCOUNTER — HOSPITAL ENCOUNTER (OUTPATIENT)
Dept: ULTRASOUND IMAGING | Facility: CLINIC | Age: 33
Discharge: HOME | End: 2023-05-22
Attending: ADVANCED PRACTICE MIDWIFE
Payer: COMMERCIAL

## 2023-05-22 VITALS
WEIGHT: 109.1 LBS | DIASTOLIC BLOOD PRESSURE: 61 MMHG | SYSTOLIC BLOOD PRESSURE: 94 MMHG | HEIGHT: 59 IN | HEART RATE: 69 BPM | BODY MASS INDEX: 22 KG/M2

## 2023-05-22 DIAGNOSIS — O26.90 PREGNANCY RELATED CONDITION, ANTEPARTUM: ICD-10-CM

## 2023-05-22 DIAGNOSIS — O24.410 DIET CONTROLLED GESTATIONAL DIABETES MELLITUS (GDM) IN SECOND TRIMESTER: Primary | ICD-10-CM

## 2023-05-22 DIAGNOSIS — O24.410 DIET CONTROLLED GESTATIONAL DIABETES MELLITUS (GDM) IN SECOND TRIMESTER: ICD-10-CM

## 2023-05-22 DIAGNOSIS — Z34.02 ENCOUNTER FOR SUPERVISION OF NORMAL FIRST PREGNANCY IN SECOND TRIMESTER: Primary | ICD-10-CM

## 2023-05-22 PROCEDURE — G0463 HOSPITAL OUTPT CLINIC VISIT: HCPCS | Mod: 25 | Performed by: ADVANCED PRACTICE MIDWIFE

## 2023-05-22 PROCEDURE — 99207 PR PRENATAL VISIT: CPT | Performed by: ADVANCED PRACTICE MIDWIFE

## 2023-05-22 PROCEDURE — 76811 OB US DETAILED SNGL FETUS: CPT | Mod: 26 | Performed by: OBSTETRICS & GYNECOLOGY

## 2023-05-22 PROCEDURE — 76811 OB US DETAILED SNGL FETUS: CPT

## 2023-05-22 NOTE — PROGRESS NOTES
"Subjective:      32 year old  at 18w2d presents for a routine prenatal appointment.         Denies cramping/contractions, vaginal bleeding, discharge or leakage of fluid. Reports +fetal movement.  No HA, vision changes, ruq/epigastric pain.       Patient concerns: Feeling well overall. Pregnancy c/b GDM vs pregestational diabetes-  early 1 hour glucose test of 200 at 14+3. Met with diabetic educator on  and 5/10. All BG values WNL. Pt states she has not changed anything with her diet or activity.  All values since last educator appt have been normal.   Wondering how long she has to keep testing.    Had MFM ultrasound today. Report not yet entered. Per pt, all needed but repeat needed for growth.     GDM:   Current therapy:   Nutritional Therapy    She is checking her blood sugars regularly, including Fasting.    Blood sugars are as follows:     Date Fasting Post-Breakfast Post-Lunch Post-Dinner    80 126 128 124    74 115 117 127    76 119 89 110    82 140 81 88   5/15 83 138 127 129    79 97 124 131    83 128 100 104     77 134 86 124    83 116 126 96     80 119 113 94    75 123 147 106  Today   80 102    Diabetes Symptoms:   none    GDM Assess:  After reviewing her blood sugars, greater than 50% are at target. Based on this, I recommend Continued dietary and life-style modifications.     GDM Plan:   Continue dietary modifications, regular exercise as able  Continue to check blood glucose 4x daily  Bring glucose log to all appointments  Needs 2-hr GTT at 6 wks post-partum              Objective:  Vitals:    23 1042   BP: 94/61   BP Location: Left arm   Patient Position: Chair   Pulse: 69   Weight: 49.5 kg (109 lb 1.6 oz)   Height: 1.499 m (4' 11\")         See OB flowsheet    Assessment/Plan     Encounter Diagnoses   Name Primary?     Encounter for supervision of normal first pregnancy in second trimester Yes     Diet controlled gestational diabetes mellitus (GDM) " vs pre-gestational, early 1 hour at 14+3 =200      No orders of the defined types were placed in this encounter.    No orders of the defined types were placed in this encounter.      - Reviewed total weight gain, encouraged continued healthy diet and exercise.      - Reviewed why/how to contact provider.      Patient education/orders or handouts today:  PTL signs/symptoms and fetal movement    -  Hepatitis B #2 due after 23. Will give at next visit.     - Reviewed recommendations:   - Continue QID blood glucose testing   - Fasting 2 hour glucose test at 6 weeks postpartum   - Annual fasting glucose test    - MFM ultrasound report not yet entered. Repeat MFM ultrasound is scheduled for 23.    Will need to review  surveillance and delivery timing recommendations for pt's diagnosis.    Continue scheduled prenatal care, RTC in 4 weeks and prn if questions or concerns.      JARED Lowery, CNM

## 2023-05-22 NOTE — PATIENT INSTRUCTIONS
Thank you for trusting us with your care!     If you need to contact us for questions about:  Symptoms, Scheduling & Medical Questions; Non-urgent (2-3 day response) Sunni message, Urgent (needing response today) [phone] (if after 3:30pm next day response)   Prescriptions: Please call your Pharmacy   Billing: [facility] [phone] or [facility]:[phone]

## 2023-05-22 NOTE — NURSING NOTE
Chief Complaint   Patient presents with     Prenatal Care     18w2d       See Vladislav, CMA 5/22/2023

## 2023-05-22 NOTE — LETTER
2023       RE: Jennifer Roberts  [address] [place] [address] [address]  [facility] [address]     Dear Colleague,    Thank you for referring your patient, Jennifer Roberts, to the [facility] [place] at [facility]. Please see a copy of my visit note below.    Subjective:      32 year old  at 18w2d presents for a routine prenatal appointment.         Denies cramping/contractions, vaginal bleeding, discharge or leakage of fluid. Reports +fetal movement.  No HA, vision changes, ruq/epigastric pain.       Patient concerns: Feeling well overall. Pregnancy c/b GDM vs pregestational diabetes-  early 1 hour glucose test of 200 at 14+3. Met with diabetic educator on  and 5/10. All BG values WNL. Pt states she has not changed anything with her diet or activity.  All values since last educator appt have been normal.   Wondering how long she has to keep testing.    Had MFM ultrasound today. Report not yet entered. Per pt, all needed but repeat needed for growth.     GDM:   Current therapy:   Nutritional Therapy    She is checking her blood sugars regularly, including Fasting.    Blood sugars are as follows:     Date Fasting Post-Breakfast Post-Lunch Post-Dinner    80 126 128 124    74 115 117 127    76 119 89 110    82 140 81 88   5/15 83 138 127 129    79 97 124 131    83 128 100 104     77 134 86 124    83 116 126 96     80 119 113 94    75 123 147 106  Today   80 102    Diabetes Symptoms:   none    GDM Assess:  After reviewing her blood sugars, greater than 50% are at target. Based on this, I recommend Continued dietary and life-style modifications.     GDM Plan:   Continue dietary modifications, regular exercise as able  Continue to check blood glucose 4x daily  Bring glucose log to all appointments  Needs 2-hr GTT at 6 wks post-partum              Objective:  Vitals:    23 1042   BP: 94/61   BP Location:  "Left arm   Patient Position: Chair   Pulse: 69   Weight: 49.5 kg (109 lb 1.6 oz)   Height: 1.499 m (4' 11\")         See OB flowsheet    Assessment/Plan     Encounter Diagnoses   Name Primary?    Encounter for supervision of normal first pregnancy in second trimester Yes    Diet controlled gestational diabetes mellitus (GDM) vs pre-gestational, early 1 hour at 14+3 =200      No orders of the defined types were placed in this encounter.    No orders of the defined types were placed in this encounter.      - Reviewed total weight gain, encouraged continued healthy diet and exercise.      - Reviewed why/how to contact provider.      Patient education/orders or handouts today:  PTL signs/symptoms and fetal movement    -  Hepatitis B #2 due after 23. Will give at next visit.     - Reviewed recommendations:   - Continue QID blood glucose testing   - Fasting 2 hour glucose test at 6 weeks postpartum   - Annual fasting glucose test    - MFM ultrasound report not yet entered. Repeat MFM ultrasound is scheduled for 23.    Will need to review  surveillance and delivery timing recommendations for pt's diagnosis.    Continue scheduled prenatal care, RTC in 4 weeks and prn if questions or concerns.      JARED Lowery, CNM   "

## 2023-06-13 ENCOUNTER — OFFICE VISIT (OUTPATIENT)
Dept: OBGYN | Facility: CLINIC | Age: 33
End: 2023-06-13
Attending: REGISTERED NURSE
Payer: COMMERCIAL

## 2023-06-13 ENCOUNTER — TELEPHONE (OUTPATIENT)
Dept: OBGYN | Facility: CLINIC | Age: 33
End: 2023-06-13

## 2023-06-13 ENCOUNTER — OFFICE VISIT (OUTPATIENT)
Dept: MATERNAL FETAL MEDICINE | Facility: CLINIC | Age: 33
End: 2023-06-13
Attending: OBSTETRICS & GYNECOLOGY
Payer: COMMERCIAL

## 2023-06-13 ENCOUNTER — HOSPITAL ENCOUNTER (OUTPATIENT)
Dept: ULTRASOUND IMAGING | Facility: CLINIC | Age: 33
Discharge: HOME | End: 2023-06-13
Attending: OBSTETRICS & GYNECOLOGY
Payer: COMMERCIAL

## 2023-06-13 VITALS
SYSTOLIC BLOOD PRESSURE: 97 MMHG | HEIGHT: 59 IN | HEART RATE: 79 BPM | BODY MASS INDEX: 23.39 KG/M2 | WEIGHT: 116 LBS | DIASTOLIC BLOOD PRESSURE: 61 MMHG

## 2023-06-13 DIAGNOSIS — O24.410 DIET CONTROLLED GESTATIONAL DIABETES MELLITUS (GDM) IN SECOND TRIMESTER: ICD-10-CM

## 2023-06-13 DIAGNOSIS — Z36.2 ENCOUNTER FOR FOLLOW-UP ULTRASOUND OF FETAL ANATOMY: ICD-10-CM

## 2023-06-13 DIAGNOSIS — Z36.89 ENCOUNTER FOR ULTRASOUND TO ASSESS FETAL GROWTH: Primary | ICD-10-CM

## 2023-06-13 DIAGNOSIS — Z34.02 ENCOUNTER FOR SUPERVISION OF NORMAL FIRST PREGNANCY IN SECOND TRIMESTER: Primary | ICD-10-CM

## 2023-06-13 PROCEDURE — 250N000011 HC RX IP 250 OP 636

## 2023-06-13 PROCEDURE — 76816 OB US FOLLOW-UP PER FETUS: CPT | Mod: 26 | Performed by: STUDENT IN AN ORGANIZED HEALTH CARE EDUCATION/TRAINING PROGRAM

## 2023-06-13 PROCEDURE — 99207 PR PRENATAL VISIT: CPT | Performed by: REGISTERED NURSE

## 2023-06-13 PROCEDURE — G0463 HOSPITAL OUTPT CLINIC VISIT: HCPCS | Mod: 25 | Performed by: REGISTERED NURSE

## 2023-06-13 PROCEDURE — G0010 ADMIN HEPATITIS B VACCINE: HCPCS

## 2023-06-13 PROCEDURE — 76816 OB US FOLLOW-UP PER FETUS: CPT

## 2023-06-13 PROCEDURE — 90746 HEPB VACCINE 3 DOSE ADULT IM: CPT

## 2023-06-13 RX ORDER — LANCETS
EACH MISCELLANEOUS
COMMUNITY
Start: 2023-04-25

## 2023-06-13 NOTE — PROGRESS NOTES
Please see the full imaging report from the ViewPoint program under the imaging tab.    Mami Alex MD  Maternal Fetal Medicine

## 2023-06-13 NOTE — TELEPHONE ENCOUNTER
Called pt with assist of interpretor. They will be reaching out to schedule another visit. Provided number to call to schedule as well. Patient stated understanding.     Diabetes education number: [phone]

## 2023-06-13 NOTE — LETTER
"2023       RE: Jennifer Roberts  [address] [place] [address] [address]  [facility] [address]     Dear Colleague,    Thank you for referring your patient, Jennifer Roberts, to the [facility] [place] at [facility]. Please see a copy of my visit note below.    Subjective:   Phone : Persian- used for duration of face-to-face   32 year old  at 21w3d presents for a routine prenatal appointment.       no vaginal bleeding or leakage of fluid.  no contractions or cramping.    Reports fetal movement.       No HA, visual changes, RUQ or epigastric pain.     Level II US 23 WDL except suboptimal views:  f/u completed today. , cephalic, posterior placenta, 3vc, MVP 6.0cm, growth 14%. Repeat growth US scheduled w/MFM 23 given low weight percentile in the setting of gestational diabetes.     - GDM control: Reviewed blood sugars from the past 1 week: Fasting: all <95. Range: 75-93 but post-meal: near daily elevations (140s) after breakfast and a few post-lunch elevations (140s). Has been following diet recommendations provided by diabetes education. She also walks 30 minutes daily and 15 minute arm excercises with 1lb weight. Does not have follow up with diabetes education.   - Hep B #2 due today: Patient accepts.     The patient presents with the following concerns:   - mild low back pain on Saturday wondering if this is normal.         Objective:  Vitals:    23 0946   BP: 97/61   BP Location: Left arm   Patient Position: Chair   Pulse: 79   Weight: 52.6 kg (116 lb)   Height: 1.499 m (4' 11\")     See OB flowsheet    Assessment/Plan  1. Encounter for supervision of normal first pregnancy in second trimester    2. Diet controlled gestational diabetes mellitus (GDM) vs pre-gestational, early 1 hour at 14+3 =200    Orders Placed This Encounter   Procedures    HEPATITIS B VACCINE,ADULT,IM     Orders Placed This Encounter "   Medications    blood glucose monitoring (SOFTCLIX) lancets     Sig: test four times daily     - Reviewed relief measures for mild back pain including stretching, walking, Tylenol PRN and ice.   - Plan for follow up with diabetes education re: mealtime blood sugar elevations. Message sent to RN team to help coordinate this for the patient.   - Hep B #2 given today  - Reviewed total weight gain, encouraged continued healthy diet and exercise.  - Plan to follow up with diabetes education.       - Reviewed why/how to contact provider.      Patient education/orders or handouts today:  PTL signs/symptoms   Return to clinic in 4 weeks and prn if questions or concerns.   JARED Carreon CNM

## 2023-06-13 NOTE — PATIENT INSTRUCTIONS
Thank you for trusting us with your care!     If you need to contact us for questions about:  Symptoms, Scheduling & Medical Questions; Non-urgent (2-3 day response) Sunni message, Urgent (needing response today) [phone] (if after 3:30pm next day response)   Prescriptions: Please call your Pharmacy   Billing: [facility] [phone] or [facility]:[phone]

## 2023-06-13 NOTE — PROGRESS NOTES
"Subjective:   Phone : Albanian- used for duration of face-to-face   32 year old  at 21w3d presents for a routine prenatal appointment.       no vaginal bleeding or leakage of fluid.  no contractions or cramping.    Reports fetal movement.       No HA, visual changes, RUQ or epigastric pain.     Level II US 23 WDL except suboptimal views:  f/u completed today. , cephalic, posterior placenta, 3vc, MVP 6.0cm, growth 14%. Repeat growth US scheduled w/MFM 23 given low weight percentile in the setting of gestational diabetes.     - GDM control: Reviewed blood sugars from the past 1 week: Fasting: all <95. Range: 75-93 but post-meal: near daily elevations (140s) after breakfast and a few post-lunch elevations (140s). Has been following diet recommendations provided by diabetes education. She also walks 30 minutes daily and 15 minute arm excercises with 1lb weight. Does not have follow up with diabetes education.   - Hep B #2 due today: Patient accepts.     The patient presents with the following concerns:   - mild low back pain on Saturday wondering if this is normal.         Objective:  Vitals:    23 0946   BP: 97/61   BP Location: Left arm   Patient Position: Chair   Pulse: 79   Weight: 52.6 kg (116 lb)   Height: 1.499 m (4' 11\")     See OB flowsheet    Assessment/Plan  1. Encounter for supervision of normal first pregnancy in second trimester    2. Diet controlled gestational diabetes mellitus (GDM) vs pre-gestational, early 1 hour at 14+3 =200    Orders Placed This Encounter   Procedures     HEPATITIS B VACCINE,ADULT,IM     Orders Placed This Encounter   Medications     blood glucose monitoring (SOFTCLIX) lancets     Sig: test four times daily     - Reviewed relief measures for mild back pain including stretching, walking, Tylenol PRN and ice.   - Plan for follow up with diabetes education re: mealtime blood sugar elevations. Message sent to RN team to help coordinate this for the " patient.   - Hep B #2 given today  - Reviewed total weight gain, encouraged continued healthy diet and exercise.  - Plan to follow up with diabetes education.       - Reviewed why/how to contact provider.      Patient education/orders or handouts today:  PTL signs/symptoms   Return to clinic in 4 weeks and prn if questions or concerns.   JARED CarreonM

## 2023-06-13 NOTE — NURSING NOTE
() used for patient's ultrasound and provider visit today at [facility].  Patient reports feeling fetal movement, states pain in right side of her back x1 - instructed to notify providers at her visit today, denies contractions, leaking of fluid, or bleeding.  Reports blood sugar values fasting and hr post prandial are wnl, controlled by diet only at this time. SBAR given to [facility] MD, see their note in Epic.

## 2023-06-14 ENCOUNTER — APPOINTMENT (OUTPATIENT)
Dept: INTERPRETER SERVICES | Facility: CLINIC | Age: 33
End: 2023-06-14
Payer: COMMERCIAL

## 2023-06-19 NOTE — TELEPHONE ENCOUNTER
Discussed with OBGYN provider and pt will be following up with Dr. Isaacs on June 30th. Pt was doing a good job with the meal plan at her last appt, so may just need to look at starting insulin as her pregnancy is progressing and given she was dx so early in pregnancy.    Radha Serrano RN, [facility]

## 2023-06-29 ASSESSMENT — ENCOUNTER SYMPTOMS
POOR WOUND HEALING: 0
PANIC: 0
INSOMNIA: 1
SKIN CHANGES: 0
NERVOUS/ANXIOUS: 1
DECREASED CONCENTRATION: 0
DEPRESSION: 0
NAIL CHANGES: 0

## 2023-06-30 ENCOUNTER — OFFICE VISIT (OUTPATIENT)
Dept: FAMILY MEDICINE | Facility: CLINIC | Age: 33
End: 2023-06-30
Attending: FAMILY MEDICINE
Payer: COMMERCIAL

## 2023-06-30 VITALS
WEIGHT: 118 LBS | DIASTOLIC BLOOD PRESSURE: 66 MMHG | HEART RATE: 77 BPM | SYSTOLIC BLOOD PRESSURE: 101 MMHG | BODY MASS INDEX: 23.79 KG/M2 | HEIGHT: 59 IN

## 2023-06-30 DIAGNOSIS — O24.410 DIET CONTROLLED GESTATIONAL DIABETES MELLITUS (GDM) IN SECOND TRIMESTER: Primary | ICD-10-CM

## 2023-06-30 PROCEDURE — G0463 HOSPITAL OUTPT CLINIC VISIT: HCPCS | Performed by: FAMILY MEDICINE

## 2023-06-30 PROCEDURE — 99203 OFFICE O/P NEW LOW 30 MIN: CPT | Performed by: FAMILY MEDICINE

## 2023-06-30 ASSESSMENT — PAIN SCALES - GENERAL: PAINLEVEL: NO PAIN (0)

## 2023-06-30 NOTE — PROGRESS NOTES
"  [facility]    HPI:  Jennifer Roberts is a 32 year old  at 23w6d with a new diagnosis of gestational diabetes.  A1c 4.6% on . Early 1 hour on  was 200.      She has seen diabetes education.   She does not have a history of gestational diabetes.   She does not have a history of Type 2 Diabetes.   She does  not have a family history of Type 2 Diabetes. Her mom has prediabetes.   She does not have a history of pre-term labor.   She does not have a history of chronic hypertension, gestational hypertension or pre-eclampsia.   She does not have a history of LGA (infant >10 lbs).     She is checking her blood sugars regularly, including Fasting and 1-hour post-prandial.    She is following diabetic diet but she does notice if she has soda or juice with her meal, her blood sugar will be elevated (the two 140 readings below).   She is exercising regularly in the mornings, walks and does arm movements at the same time. She will exercise after breakfast. She didn't exercise today because she had to come to this appointment and blood sugar is a bit elevated this morning (140).      Blood sugars are as follows:     Date Fasting Post-Breakfast Post-Lunch Post-Dinner    87 140      83 107 131 135    81 136 103 106    85 136 140 118    81 99 137 83    83 134 110 111    89 121 107 100         Physical Exam:   /66   Pulse 77   Ht 1.499 m (4' 11\")   Wt 53.5 kg (118 lb)   LMP 2023   BMI 23.83 kg/m    Gen: Pleasant female, in NAD  Abd: Gravid abdomen.    Assessment:   Jennifer Roberts is a 32 year old  at 23w6d with a new diagnosis of gestational diabetes. She has the following risk factors that indicate early need for insulin therapy, including: Age >30 and GDM Diagnosis <24 wks. This is not 6 or 7 risk factors. After reviewing her blood sugars, greater than 50% are at target. Based on this, I recommend Continued dietary and life-style " modifications.     Plan:   Continue dietary modifications, regular exercise as able  Continue to check blood glucose 4x daily  Bring glucose log to all appointments  Needs 2-hr GTT at 6 wks post-partum  Discussed exercise after meals can help improve blood sugar readings   Discussed limiting juice and soda as she does notice a correlation between juice/soda and elevated postprandial values  Follow-up with OB team to review blood sugars, refer back to MD if 3 or more readings in one week elevated    Akiko Isaacs MD

## 2023-07-04 NOTE — PROGRESS NOTES
"Subjective:      32 year old  at 24w4d presents for a routine prenatal appointment.         Denies cramping/contractions, vaginal bleeding, discharge or leakage of fluid. Reports +fetal movement.     Patient concerns: Feeling well overall.    Pregnancy c/b GDM vs pregestational diabetes-  early 1 hour glucose test of 200 at 14+3.  - Met with Dr. Isaacs on  and . Elevated postprandial values on . At last visit, only 2 elevated- recommendation for continued diet control. Reviewed log-  FBS <90, 1 hour -130.    - MFM ultrasound today- EFW 20%tile, AC 26%tile. Has repeat scheduled for next month.    - Initial BP elevated. Pt reports she was in a hurry getting here. No hx of elevated BPs, no hx of HTN. Repeat BPs wnl. No HA, vision changes, ruq/epigastric pain.      Objective:  Vitals:    23 1446 23 1500   BP: (!) 148/62 95/61   Pulse: 81    Weight: 54.4 kg (120 lb)    Height: 1.499 m (4' 11\")        Repeat BP: av/61  - 97/62, 97/64, 93/58    See OB flowsheet    Assessment/Plan     Encounter Diagnoses   Name Primary?     Encounter for supervision of normal first pregnancy in second trimester Yes     Diet controlled gestational diabetes mellitus (GDM) vs pre-gestational, early 1 hour at 14+3 =200      Elevated BP without diagnosis of hypertension      Orders Placed This Encounter   Procedures     AST     ALT     Uric acid     Creatinine     CBC with Platelets     Protein  random urine     - Reviewed total weight gain, encouraged continued healthy diet and exercise.      - Reviewed why/how to contact provider.      Patient education/orders or handouts today:  PTL signs/symptoms, fetal movement and Plan for EOB visit w labs    - Reviewed plan for GDM. Continue care with Dr. Isaacs. Close monitoring of values- notify provider if abnormal values.   - Reviewed MFM recommendations: q4 week growth ultrasound. IOL scheduled for 39-40+6.  Next scheduled for 23.  - Reviewed elevated bp, " normal repeats. Pre-eclampsia labs ordered.  - Plan EOB next visit.     Continue scheduled prenatal care, RTC in 3 weeks for EOB, BP check and prn if questions or concerns.      JARED Lowery, JACOBM

## 2023-07-05 ENCOUNTER — MYC MEDICAL ADVICE (OUTPATIENT)
Dept: OBGYN | Facility: CLINIC | Age: 33
End: 2023-07-05

## 2023-07-05 ENCOUNTER — LAB (OUTPATIENT)
Dept: LAB | Facility: CLINIC | Age: 33
End: 2023-07-05
Attending: STUDENT IN AN ORGANIZED HEALTH CARE EDUCATION/TRAINING PROGRAM
Payer: COMMERCIAL

## 2023-07-05 ENCOUNTER — HOSPITAL ENCOUNTER (OUTPATIENT)
Dept: ULTRASOUND IMAGING | Facility: CLINIC | Age: 33
Discharge: HOME | End: 2023-07-05
Attending: STUDENT IN AN ORGANIZED HEALTH CARE EDUCATION/TRAINING PROGRAM
Payer: COMMERCIAL

## 2023-07-05 ENCOUNTER — OFFICE VISIT (OUTPATIENT)
Dept: MATERNAL FETAL MEDICINE | Facility: CLINIC | Age: 33
End: 2023-07-05
Attending: STUDENT IN AN ORGANIZED HEALTH CARE EDUCATION/TRAINING PROGRAM
Payer: COMMERCIAL

## 2023-07-05 ENCOUNTER — OFFICE VISIT (OUTPATIENT)
Dept: OBGYN | Facility: CLINIC | Age: 33
End: 2023-07-05
Attending: ADVANCED PRACTICE MIDWIFE
Payer: COMMERCIAL

## 2023-07-05 VITALS
DIASTOLIC BLOOD PRESSURE: 61 MMHG | HEIGHT: 59 IN | HEART RATE: 81 BPM | WEIGHT: 120 LBS | SYSTOLIC BLOOD PRESSURE: 95 MMHG | BODY MASS INDEX: 24.19 KG/M2

## 2023-07-05 DIAGNOSIS — O24.410 DIET CONTROLLED GESTATIONAL DIABETES MELLITUS (GDM) IN SECOND TRIMESTER: Primary | ICD-10-CM

## 2023-07-05 DIAGNOSIS — R03.0 ELEVATED BP WITHOUT DIAGNOSIS OF HYPERTENSION: ICD-10-CM

## 2023-07-05 DIAGNOSIS — Z36.89 ENCOUNTER FOR ULTRASOUND TO ASSESS FETAL GROWTH: ICD-10-CM

## 2023-07-05 DIAGNOSIS — O24.410 DIET CONTROLLED GESTATIONAL DIABETES MELLITUS (GDM) IN SECOND TRIMESTER: ICD-10-CM

## 2023-07-05 DIAGNOSIS — Z34.02 ENCOUNTER FOR SUPERVISION OF NORMAL FIRST PREGNANCY IN SECOND TRIMESTER: Primary | ICD-10-CM

## 2023-07-05 DIAGNOSIS — Z34.02 ENCOUNTER FOR SUPERVISION OF NORMAL FIRST PREGNANCY IN SECOND TRIMESTER: ICD-10-CM

## 2023-07-05 LAB
ALBUMIN MFR UR ELPH: 6.1 MG/DL
ALT SERPL W P-5'-P-CCNC: 11 U/L (ref 0–50)
AST SERPL W P-5'-P-CCNC: 19 U/L (ref 0–45)
CREAT SERPL-MCNC: 0.5 MG/DL (ref 0.51–0.95)
CREAT UR-MCNC: 75.8 MG/DL
ERYTHROCYTE [DISTWIDTH] IN BLOOD BY AUTOMATED COUNT: 12.6 % (ref 10–15)
GFR SERPL CREATININE-BSD FRML MDRD: >90 ML/MIN/1.73M2
HCT VFR BLD AUTO: 36.1 % (ref 35–47)
HGB BLD-MCNC: 12.3 G/DL (ref 11.7–15.7)
MCH RBC QN AUTO: 33 PG (ref 26.5–33)
MCHC RBC AUTO-ENTMCNC: 34.1 G/DL (ref 31.5–36.5)
MCV RBC AUTO: 97 FL (ref 78–100)
PLATELET # BLD AUTO: 174 10E3/UL (ref 150–450)
PROT/CREAT 24H UR: 0.08 MG/MG CR (ref 0–0.2)
RBC # BLD AUTO: 3.73 10E6/UL (ref 3.8–5.2)
URATE SERPL-MCNC: 3.2 MG/DL (ref 2.4–5.7)
WBC # BLD AUTO: 10 10E3/UL (ref 4–11)

## 2023-07-05 PROCEDURE — 82565 ASSAY OF CREATININE: CPT

## 2023-07-05 PROCEDURE — 84460 ALANINE AMINO (ALT) (SGPT): CPT

## 2023-07-05 PROCEDURE — G0463 HOSPITAL OUTPT CLINIC VISIT: HCPCS | Performed by: ADVANCED PRACTICE MIDWIFE

## 2023-07-05 PROCEDURE — 84550 ASSAY OF BLOOD/URIC ACID: CPT

## 2023-07-05 PROCEDURE — 84156 ASSAY OF PROTEIN URINE: CPT

## 2023-07-05 PROCEDURE — 85014 HEMATOCRIT: CPT

## 2023-07-05 PROCEDURE — 36415 COLL VENOUS BLD VENIPUNCTURE: CPT

## 2023-07-05 PROCEDURE — 84450 TRANSFERASE (AST) (SGOT): CPT

## 2023-07-05 PROCEDURE — 76816 OB US FOLLOW-UP PER FETUS: CPT | Mod: 26 | Performed by: OBSTETRICS & GYNECOLOGY

## 2023-07-05 PROCEDURE — 76816 OB US FOLLOW-UP PER FETUS: CPT

## 2023-07-05 PROCEDURE — 99207 PR PRENATAL VISIT: CPT | Performed by: ADVANCED PRACTICE MIDWIFE

## 2023-07-05 NOTE — PROGRESS NOTES
Please refer to ultrasound report under 'Imaging' Studies of 'Chart Review' tabs.    Gilbert Brown M.D.

## 2023-07-05 NOTE — PATIENT INSTRUCTIONS
Blood Glucose Screening During Pregnancy  Gestational diabetes is diabetes that only pregnant women get. Changes in your body during pregnancy can cause high blood sugar (glucose). This can cause problems for you and your baby. It is a serious condition. But it can be controlled.      Your healthcare provider will talk with you about blood glucose screening.     Who is at risk for gestational diabetes?  You are at risk of getting gestational diabetes if any of the risk factors below apply to you. The risk for this condition gets higher as your number of risk factors increases:     You are , , , , or .    You weigh more than your healthcare provider says is healthy for you.    You have a relative with diabetes.    You are older than 25.    You had gestational diabetes during a past pregnancy.    You had a stillbirth or a very large baby before.    You have a history of abnormal glucose tolerance.    You have sugar in your urine at the first prenatal visit.    You have metabolic syndrome, PCOS (polycystic ovary syndrome), are using glucocorticoids, or have high blood pressure.    You are pregnant with twins or more  What happens during a screening?  Here is what to expect during a blood glucose screening:    There is conflicting advice for screening. But the American College of Obstetricians and Gynecologists currently advises that all pregnant women be screened for gestational diabetes. When you are screened depends on your risk. Women are tested at 24 to 28 weeks of pregnancy. Women at high risk may be tested when they first learn they are pregnant.    To do the screening, a blood sample is taken. Your blood sugar level is measured.    If the results show a high blood sugar level, a glucose tolerance test may be ordered. You will drink a certain amount of sugar. This test measures how long it takes for sugar to leave your blood. The test will show if  you have gestational diabetes.  What to know if you test positive  Here are some things you need to know:    Gestational diabetes can be treated. The best way to control it is to find out you have it early and start treatment quickly.    This condition can cause problems for the mother during pregnancy. It can also cause problems with the baby during pregnancy, delivery, and after. Treatment greatly lowers the chance for problems.    The changes in your body that cause gestational diabetes normally happen only when you are pregnant. After the baby is born, your body goes back to normal. The condition goes away. But you may be more likely to have type 2 diabetes later. Talk with your healthcare provider about ways to help prevent type 2 diabetes.  Treating gestational diabetes  Here is how to treat gestational diabetes:    You ll need to check your blood sugar often. You can do this at home. Prick your finger and check a drop of blood on a glucose monitor. Your healthcare provider will show you how and when to check your blood sugar. They will talk about your target blood sugar level.    To manage your blood sugar, you will be given a special plan. It will likely include meal planning and getting regular exercise. Some women need to take a hormone called insulin. Others may take medicine to help control their blood sugar.  [employer] last reviewed this educational content on 12/1/2022 2000-2023 [employer]. All rights reserved. This information is not intended as a substitute for professional medical care. Always follow your healthcare professional's instructions.

## 2023-07-05 NOTE — LETTER
"2023       RE: Jennifer Roberts  [address] [place] [address] [address]  [facility] [address]     Dear Colleague,    Thank you for referring your patient, Jennifer Roberts, to the [facility] [place] at [facility]. Please see a copy of my visit note below.    Subjective:      32 year old  at 24w4d presents for a routine prenatal appointment.         Denies cramping/contractions, vaginal bleeding, discharge or leakage of fluid. Reports +fetal movement.     Patient concerns: Feeling well overall.    Pregnancy c/b GDM vs pregestational diabetes-  early 1 hour glucose test of 200 at 14+3.  - Met with Dr. Isaacs on  and . Elevated postprandial values on . At last visit, only 2 elevated- recommendation for continued diet control. Reviewed log-  FBS <90, 1 hour -130.    - MFM ultrasound today- EFW 20%tile, AC 26%tile. Has repeat scheduled for next month.    - Initial BP elevated. Pt reports she was in a hurry getting here. No hx of elevated BPs, no hx of HTN. Repeat BPs wnl. No HA, vision changes, ruq/epigastric pain.      Objective:  Vitals:    23 1446 23 1500   BP: (!) 148/62 95/61   Pulse: 81    Weight: 54.4 kg (120 lb)    Height: 1.499 m (4' 11\")        Repeat BP: av/61  - 97/62, 97/64, 93/58    See OB flowsheet    Assessment/Plan     Encounter Diagnoses   Name Primary?    Encounter for supervision of normal first pregnancy in second trimester Yes    Diet controlled gestational diabetes mellitus (GDM) vs pre-gestational, early 1 hour at 14+3 =200     Elevated BP without diagnosis of hypertension      Orders Placed This Encounter   Procedures    AST    ALT    Uric acid    Creatinine    CBC with Platelets    Protein  random urine     - Reviewed total weight gain, encouraged continued healthy diet and exercise.      - Reviewed why/how to contact provider.      Patient education/orders or handouts today:  PTL " signs/symptoms, fetal movement and Plan for EOB visit w labs    - Reviewed plan for GDM. Continue care with Dr. Isaacs. Close monitoring of values- notify provider if abnormal values.   - Reviewed M recommendations: q4 week growth ultrasound. IOL scheduled for 39-40+6.  Next scheduled for 8/2/23.  - Reviewed elevated bp, normal repeats. Pre-eclampsia labs ordered.  - Plan EOB next visit.     Continue scheduled prenatal care, RTC in 3 weeks for EOB, BP check and prn if questions or concerns.      Massiel Glaser, JARED, CNM

## 2023-07-05 NOTE — NURSING NOTE
Ipad  used for MFM appt- [facility].    Pt reports positive fetal movement, checking blood sugars and no medication at this time.  Working on diet and lifestyle changes.

## 2023-08-02 ENCOUNTER — OFFICE VISIT (OUTPATIENT)
Dept: MATERNAL FETAL MEDICINE | Facility: CLINIC | Age: 33
End: 2023-08-02
Attending: OBSTETRICS & GYNECOLOGY
Payer: COMMERCIAL

## 2023-08-02 ENCOUNTER — LAB (OUTPATIENT)
Dept: LAB | Facility: CLINIC | Age: 33
End: 2023-08-02
Attending: OBSTETRICS & GYNECOLOGY
Payer: COMMERCIAL

## 2023-08-02 ENCOUNTER — HOSPITAL ENCOUNTER (OUTPATIENT)
Dept: ULTRASOUND IMAGING | Facility: CLINIC | Age: 33
Discharge: HOME | End: 2023-08-02
Attending: OBSTETRICS & GYNECOLOGY
Payer: COMMERCIAL

## 2023-08-02 ENCOUNTER — PRENATAL OFFICE VISIT (OUTPATIENT)
Dept: OBGYN | Facility: CLINIC | Age: 33
End: 2023-08-02
Attending: ADVANCED PRACTICE MIDWIFE
Payer: COMMERCIAL

## 2023-08-02 VITALS
DIASTOLIC BLOOD PRESSURE: 60 MMHG | WEIGHT: 121.3 LBS | SYSTOLIC BLOOD PRESSURE: 93 MMHG | HEIGHT: 59 IN | BODY MASS INDEX: 24.45 KG/M2 | HEART RATE: 69 BPM

## 2023-08-02 DIAGNOSIS — Z34.03 ENCOUNTER FOR SUPERVISION OF NORMAL FIRST PREGNANCY IN THIRD TRIMESTER: Primary | ICD-10-CM

## 2023-08-02 DIAGNOSIS — Z34.03 ENCOUNTER FOR SUPERVISION OF NORMAL FIRST PREGNANCY IN THIRD TRIMESTER: ICD-10-CM

## 2023-08-02 DIAGNOSIS — O24.410 DIET CONTROLLED GESTATIONAL DIABETES MELLITUS (GDM) IN SECOND TRIMESTER: ICD-10-CM

## 2023-08-02 DIAGNOSIS — O24.410 DIET CONTROLLED GESTATIONAL DIABETES MELLITUS (GDM) IN SECOND TRIMESTER: Primary | ICD-10-CM

## 2023-08-02 LAB
ERYTHROCYTE [DISTWIDTH] IN BLOOD BY AUTOMATED COUNT: 12.1 % (ref 10–15)
HCT VFR BLD AUTO: 35.8 % (ref 35–47)
HGB BLD-MCNC: 12.1 G/DL (ref 11.7–15.7)
MCH RBC QN AUTO: 31.9 PG (ref 26.5–33)
MCHC RBC AUTO-ENTMCNC: 33.8 G/DL (ref 31.5–36.5)
MCV RBC AUTO: 95 FL (ref 78–100)
PLATELET # BLD AUTO: 162 10E3/UL (ref 150–450)
RBC # BLD AUTO: 3.79 10E6/UL (ref 3.8–5.2)
T PALLIDUM AB SER QL: NONREACTIVE
WBC # BLD AUTO: 10.1 10E3/UL (ref 4–11)

## 2023-08-02 PROCEDURE — 82306 VITAMIN D 25 HYDROXY: CPT

## 2023-08-02 PROCEDURE — 250N000011 HC RX IP 250 OP 636

## 2023-08-02 PROCEDURE — 86780 TREPONEMA PALLIDUM: CPT

## 2023-08-02 PROCEDURE — 76816 OB US FOLLOW-UP PER FETUS: CPT | Mod: 26 | Performed by: OBSTETRICS & GYNECOLOGY

## 2023-08-02 PROCEDURE — 90471 IMMUNIZATION ADMIN: CPT

## 2023-08-02 PROCEDURE — 36415 COLL VENOUS BLD VENIPUNCTURE: CPT

## 2023-08-02 PROCEDURE — G0463 HOSPITAL OUTPT CLINIC VISIT: HCPCS | Mod: 25 | Performed by: ADVANCED PRACTICE MIDWIFE

## 2023-08-02 PROCEDURE — 85014 HEMATOCRIT: CPT

## 2023-08-02 PROCEDURE — 90715 TDAP VACCINE 7 YRS/> IM: CPT

## 2023-08-02 PROCEDURE — 76816 OB US FOLLOW-UP PER FETUS: CPT

## 2023-08-02 PROCEDURE — 99207 PR PRENATAL VISIT: CPT | Performed by: ADVANCED PRACTICE MIDWIFE

## 2023-08-02 NOTE — PATIENT INSTRUCTIONS
Thank you for trusting us with your care!     If you need to contact us for questions about:  Symptoms, Scheduling & Medical Questions; Non-urgent (2-3 day response) Sunni message, Urgent (needing response today) [phone] (if after 3:30pm next day response)   Prescriptions: Please call your Pharmacy   Billing: [facility] [phone] or [facility]:[phone]

## 2023-08-02 NOTE — PROGRESS NOTES
" 32 year old, , 28w4d, presents for EOB visit.    Patient concerns: Feeling well overall. Endorses increased sleepiness. Usually gets 8-9 hours sleep per night but sometimes only 5 hours. Unisom is helping with sleep. No other concerns.     Has growth ultrasound with MFM this morning.    Had one contraction 10 days ago and one yesterday to anterior abdomen but these were light in intensity.   Reports +fetal movement: very active  Endorses polyphagia.     Denies vaginal bleeding, vaginal discharge and LOF.   Denies Nausea, vomiting, diarrhea and constipation.   Denies headaches, vision changes, RUQ/epigastric pain  Denies polyuria and polydipsia    Pregnancy c/b GDM vs pregestational diabetes-  early 1 hour glucose test of 200 at 14+3.   Pt has had overall had normal blood glucose values since starting testing.  Did meet with Dr. Isaacs on  and . Elevated postprandial values on . At next visit, only 2 elevated- recommendation for continued diet control.   Since this time all values have been WNL.    BP 93/60   Pulse 69   Ht 1.499 m (4' 11\")   Wt 55 kg (121 lb 4.8 oz)   LMP 2023   BMI 24.50 kg/m      GDM:   Current therapy:   Nutritional Therapy  Exercise: everyday: bicep reps 30 mins, walking the park    She is checking her blood sugars regularly, 4 times a day, 1 hour after meals ( rare 2 hours later.) Sometimes when checking before walks at park and  rechecks 2 hours later -- usually low (85-87).     Blood sugars are as follows: (complete table or insert clinical media)    Date Fasting Post-Breakfast Post-Lunch Post-Dinner    86       78 127 130 137    86 110 87 121    85 124 132 117    82 139 127 108    79 131 89 107    84 98 121 97     Diabetes Symptoms:   Dizzy when low    GDM:   Current therapy:   Nutritional Therapy    She is checking her blood sugars regularly, including  morning and 1 -2 hour post prandial .    GDM Assess:  After reviewing her blood " June 29, 2022    Hello, may I speak with Marcus Núñez?    My name is Trina Story, Clinical Coordinator       I am  with [facility]  [address]  [place] [address] [phone].    Before we get started may I verify your date of birth? 1946    I am calling to officially welcome you to our practice and ask about your recent visit. Is this a good time to talk? Yes    Tell me about your visit with us. What things went well?  I guess it all went well. I don't have any complaints.        We're always looking for ways to make our patients' experiences even better. Do you have recommendations on ways we may improve?  Nope-it was all good.    Overall were you satisfied with your first visit to our practice? Yes, ma'am       I appreciate you taking the time to speak with me today. Is there anything else I can do for you? No, thank you.      Thank you, and have a great day.       sugars, greater than 50% are at target. Based on this, I recommend Continued dietary and life-style modifications.     GDM Plan:   Continue dietary modifications, regular exercise as able  Continue to check blood glucose 4x daily  Bring glucose log to all appointments  Needs 2-hr GTT at 6 wks post-partum          2023    10:35 AM 2023    10:36 AM   PHQ-9 SCORE   PHQ-9 Total Score 8 8       Education completed today includes breast feeding, [facility] hand out, contraception, counting movements, signs of pre-term labor, when to present to birthplace, post partum depression, GBS, getting enough iron, labor induction, nitrous oxide, doulas, vitamin K, and hypertension disorders.    Birth preferences reviewed: Pain management: Undecided  Labor support:     [place] [facility] plans : Breastfeeding   Contraception planned:  did not discuss this visit.  The following labs were ordered today:        CBC w platelets, Vitamin d,  Anti-treponema.  Water birth education form was  not given- not a candidate    Blood type: O+  Antibody Screen   Date Value Ref Range Status   2023 Negative Negative Final   , Rhogam  was not given.  TDAP  was given.    A/P:  Encounter Diagnoses   Name Primary?    Encounter for supervision of normal first pregnancy in third trimester Yes    Diet controlled gestational diabetes mellitus (GDM) vs pre-gestational, early 1 hour at 14+3 =200      Orders Placed This Encounter   Procedures    TDAP VACCINE (Adacel, Boostrix)  [[id number]]    Treponema Abs w Reflex to RPR and Titer    25- OH-Vitamin D    CBC with Platelets       - Labs ordered: cbc with plts, anti-trep, vitamin D.  - Tdap given.  - Reviewed plan for GDM.  Close monitoring of values- notify provider if abnormal values.   - Reviewed MFM recommendations: q4 week growth ultrasound. IOL scheduled for 39-40+6.  - Has growth ultrasound with MFM this morning.  - Plan to schedule 40 minute EOB next visit to continue  "education discussion.    Continue scheduled prenatal care, RTC in 2 weeks for EOB- education and prn if questions or concerns.      I, Segundo Stanford (PGY-1 resident), am serving as a scribe to document services personally performed by CNM based on the provider's statements to me.\" - Segundo Stanford (PGY-1 resident)    The encounter was performed by me and scribed by the resident The scribed note accurately reflects my personal services and decisions made by me.      Massiel Glaser, JARED, JAI         "

## 2023-08-03 LAB — DEPRECATED CALCIDIOL+CALCIFEROL SERPL-MC: 40 UG/L (ref 20–75)

## 2023-08-15 NOTE — PATIENT INSTRUCTIONS
Weeks 26 to 30 of Your Pregnancy: Care Instructions  You're starting your last trimester. You'll probably feel your baby moving around more. Your back may ache as your body gets used to your baby's size and length. Take care of yourself, and pay attention to what your body needs.    Talk to your doctor about getting the Tdap shot. It will help protect your  against whooping cough (pertussis). Also ask your doctor about flu and COVID-19 shots if you haven't had them yet. If your blood type is Rh negative, you may be given a shot of Rh immune globulin (such as RhoGAM). It can help prevent problems for your baby.   You may have Lincoln-Baker contractions. They are single or several strong contractions without a pattern. These are practice contractions but not the start of labor.   Be kind to yourself.     Take breaks when you're tired.  Change positions often. Don't sit for too long or stand for too long.  At work, rest during breaks if you can. If you don't get breaks, talk to your doctor about writing a letter to your employer to request them.  Avoid fumes, chemicals, and tobacco smoke.  Be sexual if you want to.     You may be interested in sex, or you may not. Everyone is different.  Sex is okay unless your doctor tells you not to.  Your belly can make it hard to find good positions for sex. [place] and explore.  Watch for signs of  labor.    These signs include:   Menstrual-like cramps. Or you may have pain or pressure in your pelvis that happens in a pattern.  About 6 or more contractions in an hour (even after rest and a glass of water).  A low, dull backache that doesn't go away when you change positions.  An increase or change in vaginal discharge.  Light vaginal bleeding or spotting.  Your water breaking.  Know what to do if you think you are having contractions.     Drink 1 or 2 glasses of water.  Lie down on your left side for at least an hour.  While on your side, feel the top of your  "belly to see if it's tight.  Write down your contractions for an hour. Time how long it is from the start of one contraction to the start of the next.  Call your doctor if you have regular contractions.  Follow-up care is a key part of your treatment and safety. Be sure to make and go to all appointments, and call your doctor if you are having problems. It's also a good idea to know your test results and keep a list of the medicines you take.  Where can you learn more?  Go to [email or web address]://[email or web address].net/patiented  Enter S999 in the search box to learn more about \"Weeks 26 to 30 of Your Pregnancy: Care Instructions.\"  Current as of: November 9, 2022               Content Version: 13.7    7889-2239 [employer].   Care instructions adapted under license by your healthcare professional. If you have questions about a medical condition or this instruction, always ask your healthcare professional. [employer] disclaims any warranty or liability for your use of this information.      Weeks 30 to 32 of Your Pregnancy: Care Instructions  Your baby is growing more every day. Its eyes can open and close, and it may have hair on its head. Your baby may sleep 20 to 45 minutes at a time and is more active at certain times.    You should feel your baby move several times every day. Your baby now turns less and kicks more.   This is a good time to tour your hospital or birthing center. You may also want to find childcare if needed.     To ease heartburn    Avoid foods that make your symptoms worse, such as chocolate, spicy foods, and caffeine.  Avoid bending over or lying down after meals.  Do not eat for 2 hours before bedtime.  Take antacids like Tums, but don't take ones that have sodium bicarbonate, magnesium trisilicate, or aspirin.    To care for large, swollen veins (varicose veins)    Try to avoid standing for long periods of time.  Sit with your feet propped up.  Wear support hose.  Get some " "exercise every day, like walking or swimming.  Counting your baby's kicks  Your doctor may ask you to count your baby's movements, such as kicks, flutters, or rolls.    Find a quiet place, and get comfortable. Write down your start time. Count your baby's movements (except hiccups). When your baby has moved 10 times, you can stop counting. Write down how many minutes it took.   If an hour goes by and you don't feel 10 movements, have something to eat or drink. Count for another hour. If you don't feel at least 10 movements in the 2-hour period, call your doctor.   Follow-up care is a key part of your treatment and safety. Be sure to make and go to all appointments, and call your doctor if you are having problems. It's also a good idea to know your test results and keep a list of the medicines you take.  Where can you learn more?  Go to https://www.[email or web address]/patiented  Enter X471 in the search box to learn more about \"Weeks 30 to 32 of Your Pregnancy: Care Instructions.\"  Current as of: November 9, 2022               Content Version: 13.7    9461-5624 [employer].   Care instructions adapted under license by your healthcare professional. If you have questions about a medical condition or this instruction, always ask your healthcare professional. [employer] disclaims any warranty or liability for your use of this information.      Learning About Birth Control After Childbirth  What is birth control?     Birth control (contraception) is any method used to prevent pregnancy.  Wait until you're healed before you have sexual intercourse. This takes about 4 to 6 weeks. If you have sex without birth control, there is a chance that you could get pregnant. This is true even if you haven't started having periods again. Even if you breastfeed, you can still get pregnant.  Most experts suggest waiting at least 18 months to get pregnant again. This can reduce risks for you and the baby. There may " "be reasons for you to get pregnant sooner, though. So talk with your doctor about the risks and benefits.  The only sure way to not get pregnant is to not have sex. But finding a good method of birth control that you're comfortable with can help you avoid an unplanned pregnancy. Your doctor can help you choose the birth control method that's right for you.  What are the types of birth control?  Long-acting reversible contraception (LARC). This is the most effective reversible method you can use to prevent pregnancy. LARCs are implants and intrauterine devices (IUDs). While they are being used, they usually prevent pregnancy for years. If you decide you want to get pregnant, you can have them removed.  Implants are placed under the skin of the arm. This can be done right after you give birth. They release the hormone progestin. The implant prevents pregnancy for up to 5 years. Talk to your doctor about how long you can use it.  IUDs are placed in the uterus by a doctor. This can be done right after you give birth, if you and your doctor discuss it beforehand. Or it can be done at a doctor visit later. There are two main types of IUDs--the copper IUD and the hormonal IUD. The hormonal IUD releases progestin. IUDs prevent pregnancy for 3 to 12 years, depending on the type. Talk to your doctor about how long you can use it.  Hormonal methods. They are very good at preventing pregnancy. Combination birth control pills (\"the pill\"), skin patches, and vaginal rings release the hormones estrogen and progestin. Depo-Provera is a shot you get every 3 months. Shots, mini-pills, IUDs, and implants release progestin only. It's best to use progestin-only options in the first few weeks after you give birth.  Barrier methods. These don't prevent pregnancy as well as implants, IUDs, or hormonal methods do. Barrier methods include condoms, diaphragms, and cervical caps. You must use them every time you have sex. If you had a " diaphragm or cervical cap before you got pregnant, talk to your doctor to see if you need a different size. Condoms can be used anytime after you give birth.  Natural family planning. This is also known as fertility awareness or the rhythm method. It can work if you and your partner are careful and you have a regular ovulation cycle. But it doesn't work better than other birth control methods. You will need to keep records so you know when you are most likely to become pregnant. And during those times, you will need to use a barrier method or not have sex.  Permanent birth control (sterilization). It gives you lasting protection against pregnancy. A man can have a vasectomy. A woman can have her tubes tied (tubal ligation). But this is only a good choice if you are sure that you don't want any more children.  Emergency contraception. This is a backup method to prevent pregnancy if you didn't use birth control or if a condom breaks. The most effective emergency contraception is an IUD (inserted by a doctor). You can also get emergency contraceptive pills. You can get them with a prescription from your doctor or without a prescription at most drugstores.  How can you get birth control?  You can buy:  Condoms and spermicides without a prescription. You can get them in drugstores, online, and in many grocery stores.  Some forms of emergency contraception without a prescription. You can get these at most drugstores.  You need to see a doctor or visit family planning clinic to:  Get a prescription for birth control pills and other methods that use hormones.  Have an implant or IUD inserted. This includes the type of IUD used for emergency contraception.  Get a hormone shot.  Get a prescription for a diaphragm or cervical cap.  Get a prescription for certain kinds of emergency contraception.  Follow-up care is a key part of your treatment and safety. Be sure to make and go to all appointments, and call your doctor if you  "are having problems. It's also a good idea to know your test results and keep a list of the medicines you take.  Where can you learn more?  Go to https://[email or web address].net/patiented  Enter X408 in the search box to learn more about \"Learning About Birth Control After Childbirth.\"  Current as of: August 2, 2022               Content Version: 13.7    6818-8551 [employer].   Care instructions adapted under license by your healthcare professional. If you have questions about a medical condition or this instruction, always ask your healthcare professional. [employer] disclaims any warranty or liability for your use of this information.    Thank you for trusting us with your care!     If you need to contact us for questions about:  Symptoms, Scheduling & Medical Questions; Non-urgent (2-3 day response) [employer] message, Urgent (needing response today) [phone] (if after 3:30pm next day response)   Prescriptions: Please call your Pharmacy   Billing: [facility] [phone] or [facility]:[phone]     "

## 2023-08-16 ENCOUNTER — PRENATAL OFFICE VISIT (OUTPATIENT)
Dept: OBGYN | Facility: CLINIC | Age: 33
End: 2023-08-16
Attending: REGISTERED NURSE
Payer: COMMERCIAL

## 2023-08-16 VITALS
HEIGHT: 59 IN | BODY MASS INDEX: 24.8 KG/M2 | DIASTOLIC BLOOD PRESSURE: 63 MMHG | WEIGHT: 123 LBS | SYSTOLIC BLOOD PRESSURE: 98 MMHG | HEART RATE: 76 BPM

## 2023-08-16 DIAGNOSIS — O24.410 DIET CONTROLLED GESTATIONAL DIABETES MELLITUS (GDM) IN SECOND TRIMESTER: ICD-10-CM

## 2023-08-16 DIAGNOSIS — Z34.02 ENCOUNTER FOR SUPERVISION OF NORMAL FIRST PREGNANCY IN SECOND TRIMESTER: Primary | ICD-10-CM

## 2023-08-16 PROCEDURE — G0463 HOSPITAL OUTPT CLINIC VISIT: HCPCS | Performed by: REGISTERED NURSE

## 2023-08-16 PROCEDURE — 99207 PR PRENATAL VISIT: CPT | Performed by: REGISTERED NURSE

## 2023-08-16 ASSESSMENT — PAIN SCALES - GENERAL: PAINLEVEL: NO PAIN (0)

## 2023-08-16 NOTE — PROGRESS NOTES
"Subjective:    IPAD  used for duration of visit: English  33 year old  at 30w4d presents for a routine prenatal appointment.    no vaginal bleeding or leakage of fluid.  Occasional BH contractions. Reports regular fetal movement.      No HA, visual changes, RUQ or epigastric pain.   Patient concerns: none.   Feeling well overall. Occasional fatigue.   Reviewed EOB labs with patient. All WNL.   Reviewed TDAP: previously received 2023  GDM control? Continues with QID testing. Fasting blood sugars have been 87-93 and 1hr post-meal have been . 100% in range over the last 2 weeks.      Objective:  Vitals:    23 1101   BP: 98/63   Pulse: 76   Weight: 55.8 kg (123 lb)   Height: 1.499 m (4' 11.02\")   , see ob flowsheet  Assessment/Plan     Encounter Diagnoses   Name Primary?    Encounter for supervision of normal first pregnancy in second trimester Yes    Diet controlled gestational diabetes mellitus (GDM) vs pre-gestational, early 1 hour at 14+3 =200      No orders of the defined types were placed in this encounter.    No orders of the defined types were placed in this encounter.    Blood type: O POS   Antibody Screen   Date Value Ref Range Status   2023 Negative Negative Final   , Rhogam was not given. Not indicated.     - Reviewed total weight gain, encouraged continued healthy diet and exercise.  Reviewed importance of daily fetal kick count and why/how to contact provider.    - Reviewed why/how to contact provider if headache/visual changes/RUQ or epigastric pain, decreased fetal movement, vaginal bleeding, leakage of fluid or more than 4 contractions in an hour.     Patient education/orders or handouts today:  PTL signs/symptoms, labor precautions, IOL process and timing given A1GDM, duration of hospital stay postpartum from , thoroughly reviewed pain management options including nitrous oxide, IV narcotics and epidural, discussed contraceptive options, lactation options and " breastfeeding initiation, milk supply, where to present in labor. All questions answered.     Reviewed GBS screening at 36-37wks.    Return to clinic in 2 weeks and prn if questions or concerns.     JARED Carreon CNM

## 2023-08-28 PROBLEM — O09.90 HIGH RISK PREGNANCY, ANTEPARTUM: Status: ACTIVE | Noted: 2023-04-15

## 2023-08-29 ENCOUNTER — PRENATAL OFFICE VISIT (OUTPATIENT)
Dept: OBGYN | Facility: CLINIC | Age: 33
End: 2023-08-29
Attending: REGISTERED NURSE
Payer: COMMERCIAL

## 2023-08-29 VITALS
HEART RATE: 77 BPM | WEIGHT: 125 LBS | DIASTOLIC BLOOD PRESSURE: 61 MMHG | BODY MASS INDEX: 25.23 KG/M2 | SYSTOLIC BLOOD PRESSURE: 96 MMHG

## 2023-08-29 DIAGNOSIS — O09.90 HIGH RISK PREGNANCY, ANTEPARTUM: Primary | ICD-10-CM

## 2023-08-29 DIAGNOSIS — O24.410 DIET CONTROLLED GESTATIONAL DIABETES MELLITUS (GDM) IN SECOND TRIMESTER: ICD-10-CM

## 2023-08-29 DIAGNOSIS — Z78.9 NOT IMMUNE TO HEPATITIS B VIRUS: ICD-10-CM

## 2023-08-29 PROCEDURE — 99207 PR PRENATAL VISIT: CPT | Performed by: REGISTERED NURSE

## 2023-08-29 PROCEDURE — G0463 HOSPITAL OUTPT CLINIC VISIT: HCPCS | Performed by: REGISTERED NURSE

## 2023-08-29 NOTE — PROGRESS NOTES
Chief Complaint   Patient presents with    Prenatal Care     LAWANDA 32 weeks and 2 days    Jeannie Peace LPN

## 2023-08-29 NOTE — PROGRESS NOTES
Subjective:    Phone  used: Colombian, for duration of visit.   33 year old  at 32w3d presents for a routine prenatal appointment.    no vaginal bleeding or leakage of fluid.  Occasional contractions at night, non-continuous. Reports regular fetal movement.       No HA, visual changes, RUQ or epigastric pain.   Patient concerns: none.  Feeling well overall. Notes more fatigue. Sleeping well at night, waking q4 hours to empty bladder.   Reviewed EOB labs with patient. All WNL   Reviewed TDAP given 23  GDM control? Well-controlled. Fasting range 81-92. Post meal values  with two values of 140 post meal in the last 2 weeks.   Growth US follow up scheduled tomorrow with [facility] d/t A1GDM    Objective:  There were no vitals filed for this visit., see ob flowsheet  Assessment/Plan     Encounter Diagnoses   Name Primary?    High risk pregnancy, antepartum Yes    Not immune to hepatitis B virus.  #1     Diet controlled gestational diabetes mellitus (GDM) vs pre-gestational, early 1 hour at 14+3 =200      No orders of the defined types were placed in this encounter.    No orders of the defined types were placed in this encounter.    Blood type: O POS   Antibody Screen   Date Value Ref Range Status   2023 Negative Negative Final    Rhogam was not given. Not indicated.     - Reviewed total weight gain, encouraged continued healthy diet and exercise.  Reviewed importance of daily fetal kick count and why/how to contact provider.    - Reviewed why/how to contact provider if headache/visual changes/RUQ or epigastric pain, decreased fetal movement, vaginal bleeding, leakage of fluid or more than 4 contractions in an hour.    - Encouraged rest, hydration for fatigue.      Patient education/orders or handouts today:  PTL signs/symptoms  Reviewed GBS screening at 36-37wks.    Return to clinic in 2 weeks and prn if questions or concerns.     JARED Carreon CNM

## 2023-08-29 NOTE — PATIENT INSTRUCTIONS
"Weeks 32 to 34 of Your Pregnancy: Care Instructions    Decide whether you want to bank or donate your baby's umbilical cord blood. If you want to save this blood, you have to arrange for it ahead of time.   Decide about circumcision. Personal, Confucianist, or cultural beliefs may play a role in your decision. You get to decide what you want for your baby.     Learn how to ease hemorrhoids.    Get more liquids, fruits, vegetables, and fiber in your diet.  Avoid sitting for too long.  Clean yourself with moist toilet paper. Or try witch hazel pads.  Try ice packs or warm sitz baths for discomfort.  Use hydrocortisone cream for pain or itching.  Ask your doctor about stool softeners.    Consider the benefits of breastfeeding.    It reduces your baby's risk of sudden infant death syndrome (SIDS).   babies are less likely to get certain infections. And they're less likely to be obese or get diabetes later in life.  It can lower your risk of breast and ovarian cancers and osteoporosis.  It saves you money.  Follow-up care is a key part of your treatment and safety. Be sure to make and go to all appointments, and call your doctor if you are having problems. It's also a good idea to know your test results and keep a list of the medicines you take.  Where can you learn more?  Go to https://www.[employer].net/patiented  Enter X711 in the search box to learn more about \"Weeks 32 to 34 of Your Pregnancy: Care Instructions.\"  Current as of: November 9, 2022               Content Version: 13.7    1593-9349 [employer].   Care instructions adapted under license by your healthcare professional. If you have questions about a medical condition or this instruction, always ask your healthcare professional. [employer] disclaims any warranty or liability for your use of this information.      You have been provided the Any Day Now: Early Labor at Home document.    Additional copies can be found here:  " "[email or web address]  You have been provided the What I'd Wish I'd Known About Giving Birth document.    Additional copies can be found here:  [email or web address]  Weeks 32 to 34 of Your Pregnancy: Care Instructions    Decide whether you want to bank or donate your baby's umbilical cord blood. If you want to save this blood, you have to arrange for it ahead of time.   Decide about circumcision. Personal, Voodoo, or cultural beliefs may play a role in your decision. You get to decide what you want for your baby.     Learn how to ease hemorrhoids.    Get more liquids, fruits, vegetables, and fiber in your diet.  Avoid sitting for too long.  Clean yourself with moist toilet paper. Or try witch hazel pads.  Try ice packs or warm sitz baths for discomfort.  Use hydrocortisone cream for pain or itching.  Ask your doctor about stool softeners.    Consider the benefits of breastfeeding.    It reduces your baby's risk of sudden infant death syndrome (SIDS).   babies are less likely to get certain infections. And they're less likely to be obese or get diabetes later in life.  It can lower your risk of breast and ovarian cancers and osteoporosis.  It saves you money.  Follow-up care is a key part of your treatment and safety. Be sure to make and go to all appointments, and call your doctor if you are having problems. It's also a good idea to know your test results and keep a list of the medicines you take.  Where can you learn more?  Go to [email or web address]/patiented  Enter X711 in the search box to learn more about \"Weeks 32 to 34 of Your Pregnancy: Care Instructions.\"  Current as of: November 9, 2022               Content Version: 13.7    8078-2290 [employer].   Care instructions adapted under license by your healthcare professional. If you have questions about a medical condition or this instruction, always ask your healthcare professional. [employer], [employer] disclaims " any warranty or liability for your use of this information.      You have been provided the Any Day Now: Early Labor at Home document.    Additional copies can be found here:  [email or web address]  You have been provided the What I'd Wish I'd Known About Giving Birth document.    Additional copies can be found here:  [email or web address]

## 2023-08-30 ENCOUNTER — OFFICE VISIT (OUTPATIENT)
Dept: MATERNAL FETAL MEDICINE | Facility: CLINIC | Age: 33
End: 2023-08-30
Attending: OBSTETRICS & GYNECOLOGY
Payer: COMMERCIAL

## 2023-08-30 ENCOUNTER — HOSPITAL ENCOUNTER (OUTPATIENT)
Dept: ULTRASOUND IMAGING | Facility: CLINIC | Age: 33
Discharge: HOME | End: 2023-08-30
Attending: OBSTETRICS & GYNECOLOGY
Payer: COMMERCIAL

## 2023-08-30 DIAGNOSIS — O24.410 DIET CONTROLLED GESTATIONAL DIABETES MELLITUS (GDM) IN SECOND TRIMESTER: ICD-10-CM

## 2023-08-30 DIAGNOSIS — O24.410 DIET CONTROLLED GESTATIONAL DIABETES MELLITUS (GDM) IN THIRD TRIMESTER: Primary | ICD-10-CM

## 2023-08-30 PROCEDURE — 76816 OB US FOLLOW-UP PER FETUS: CPT | Mod: 26 | Performed by: OBSTETRICS & GYNECOLOGY

## 2023-08-30 PROCEDURE — 76816 OB US FOLLOW-UP PER FETUS: CPT

## 2023-08-30 NOTE — NURSING NOTE
Patient reports positive fetal movement, no pain, no contractions, leaking of fluid, or bleeding.  Reports blood sugar values fasting 87 and 2 hr post prandial 105.  Patient denies headache, visual changes, nausea/vomiting, epigastric pain related to preeclampsia.  Education provided to patient on counting your baby movements.  SBAR given to CHANDNI COLON, see their note in Epic.

## 2023-09-12 ENCOUNTER — PRENATAL OFFICE VISIT (OUTPATIENT)
Dept: OBGYN | Facility: CLINIC | Age: 33
End: 2023-09-12
Attending: MIDWIFE
Payer: COMMERCIAL

## 2023-09-12 VITALS
BODY MASS INDEX: 25.44 KG/M2 | WEIGHT: 126 LBS | SYSTOLIC BLOOD PRESSURE: 99 MMHG | DIASTOLIC BLOOD PRESSURE: 61 MMHG | HEART RATE: 65 BPM

## 2023-09-12 DIAGNOSIS — O09.90 HIGH RISK PREGNANCY, ANTEPARTUM: Primary | ICD-10-CM

## 2023-09-12 DIAGNOSIS — O24.410 DIET CONTROLLED GESTATIONAL DIABETES MELLITUS (GDM) IN SECOND TRIMESTER: ICD-10-CM

## 2023-09-12 PROCEDURE — G0463 HOSPITAL OUTPT CLINIC VISIT: HCPCS | Performed by: MIDWIFE

## 2023-09-12 PROCEDURE — 99207 PR PRENATAL VISIT: CPT | Performed by: MIDWIFE

## 2023-09-12 NOTE — PATIENT INSTRUCTIONS
Weeks 34 to 36 of Your Pregnancy: Care Instructions  Your belly has grown quite large. It's almost time to give birth! Your baby's lungs are almost ready to breathe air. The skull bones are firm enough to protect your baby's head. But they're soft enough to move down through the birth canal.    You might be wondering what to expect during labor. Because each birth is different, there's no way to know exactly what childbirth will be like for you. Talk to your doctor or midwife about any concerns you have.   You'll probably have a test for group B streptococcus (GBS). GBS is bacteria that can live in the vagina and rectum. GBS can make your baby sick after birth. If you test positive, you'll get antibiotics during labor.     Choose what type of pain relief you would like during labor.  You can choose from a few types, including medicine and non-medicine options. You may want to use several types of pain relief.     Know how medicines can help with pain during labor.  Some medicines lower anxiety and help with some of the pain. Others make your lower body numb so that you will feel less pain.     Tell your doctor about your pain medicine choice.  Do this before you start labor or very early in your labor. You may be able to change your mind during labor.     Learn about the stages of labor.    The first stage includes the early (latent) and active phases of labor. Contractions start in early labor. During active labor, contractions get stronger, last longer, and happen more often. And the cervix opens more rapidly.  The second stage starts when you're ready to push. During this stage, your baby is born.  During the third stage, you'll usually have a few more contractions to push out the placenta.   Follow-up care is a key part of your treatment and safety. Be sure to make and go to all appointments, and call your doctor if you are having problems. It's also a good idea to know your test results and keep a list of the  "medicines you take.  Where can you learn more?  Go to https://www.Eversnap.net/patiented  Enter B912 in the search box to learn more about \"Weeks 34 to 36 of Your Pregnancy: Care Instructions.\"  Current as of: 2022               Content Version: 13.7    4687-1150 [employer].   Care instructions adapted under license by your healthcare professional. If you have questions about a medical condition or this instruction, always ask your healthcare professional. [employer] disclaims any warranty or liability for your use of this information.      Group B Strep During Pregnancy: Care Instructions  Overview     Group B strep infection is caused by a type of bacteria. It's a different kind of bacteria than the kind that causes strep throat.  You may have this kind of bacteria in your body. Sometimes it may cause an infection, but most of the time it doesn't make you sick or cause symptoms. But if you pass the bacteria to your baby during the birth, it can cause serious health problems for your baby.  If you have this bacteria in your body, you will get antibiotics when you are in labor. Antibiotics help prevent problems for a  baby.  After birth, doctors will watch and may test your baby. If your baby tests positive for Group B strep, your baby will get antibiotics.  If you plan to breastfeed your baby, don't worry. It will be safe to breastfeed.  Follow-up care is a key part of your treatment and safety. Be sure to make and go to all appointments, and call your doctor if you are having problems. It's also a good idea to know your test results and keep a list of the medicines you take.  How can you care for yourself at home?  If your doctor has prescribed antibiotics, take them as directed. Do not stop taking them just because you feel better. You need to take the full course of antibiotics.  Tell your doctor if you are allergic to any antibiotic.  If you go into labor, or " "your water breaks, go to the hospital. Your doctor will give you antibiotics to help protect your baby from infection.  Tell the doctors and nurses if you have an allergy to penicillin.  Tell the doctors and nurses at the hospital that you tested positive for group B strep.  When should you call for help?   Call your doctor now or seek immediate medical care if:    You have symptoms of a urinary tract infection. These may include:  Pain or burning when you urinate.  A frequent need to urinate without being able to pass much urine.  Pain in the flank, which is just below the rib cage and above the waist on either side of the back.  Blood in your urine.  A fever.     You think you are in labor or your water has broken.     You have pain in your belly or pelvis.   Watch closely for changes in your health, and be sure to contact your doctor if you have any problems.  Where can you learn more?  Go to https://www.Divshot.[employer]/patiented  Enter M001 in the search box to learn more about \"Group B Strep During Pregnancy: Care Instructions.\"  Current as of: October 31, 2022               Content Version: 13.7    4510-3285 [employer].   Care instructions adapted under license by your healthcare professional. If you have questions about a medical condition or this instruction, always ask your healthcare professional. "[employer] disclaims any warranty or liability for your use of this information.      Circumcision in Infants: What to Expect at Home  Your Child's Recovery  After circumcision, your baby's penis may look red and swollen. It may have petroleum jelly and gauze on it. The gauze will likely come off when your baby urinates. Follow your doctor's directions about whether to put clean gauze back on your baby's penis or to leave the gauze off. If you need to remove gauze from the penis, use warm water to soak the gauze and gently loosen it.  The doctor may have used a Plastibell device to do " the circumcision. If so, your baby will have a plastic ring around the head of the penis. The ring should fall off by itself in 10 to 12 days.  A thin, yellow film may form over the area the day after the procedure. This is part of the normal healing process. It should go away in a few days.  Your baby may seem fussy while the area heals. It may hurt for your baby to urinate. This pain often gets better in 3 or 4 days. But it may last for up to 2 weeks.  Even though your baby's penis will likely start to feel better after 3 or 4 days, it may look worse. The penis often starts to look like it's getting better after about 7 to 10 days.  This care sheet gives you a general idea about how long it will take for your child to recover. But each child recovers at a different pace. Follow the steps below to help your child get better as quickly as possible.  How can you care for your child at home?  Activity    Let your baby rest as much as possible. Sleeping will help with recovery.     You can give your baby a sponge bath the day after surgery. Ask your doctor when it is okay to give your baby a bath.   Medicines    Your doctor will tell you if and when your child can restart any medicines. The doctor will also give you instructions about your child taking any new medicines.     Your doctor may recommend giving your baby acetaminophen (Tylenol) to help with pain after the procedure. Be safe with medicines. Give your child medicines exactly as prescribed. Call your doctor if you think your child is having a problem with a medicine.     Do not give your child two or more pain medicines at the same time unless the doctor told you to. Many pain medicines have acetaminophen, which is Tylenol. Too much acetaminophen (Tylenol) can be harmful.   Circumcision care    Always wash your hands before and after touching the circumcision area.     Gently wash your baby's penis with plain, warm water after each diaper change, and pat it  "dry. Do not use soap. Don't use hydrogen peroxide or alcohol. They can slow healing.     Do not try to remove the film that forms on the penis. The film will go away on its own.     Put plenty of petroleum jelly (such as Vaseline) on the circumcision area during each diaper change. This will prevent your baby's penis from sticking to the diaper while it heals.     Fasten your baby's diapers loosely so that there is less pressure on the penis while it heals.   Follow-up care is a key part of your child's treatment and safety. Be sure to make and go to all appointments, and call your doctor if your child is having problems. It's also a good idea to know your child's test results and keep a list of the medicines your child takes.  When should you call for help?   Call your doctor now or seek immediate medical care if:    Your baby has a fever over 100.4 F.     Your baby is extremely fussy or irritable, has a high-pitched cry, or refuses to eat.     Your baby does not have a wet diaper within 12 hours after the circumcision.     You find a spot of bleeding larger than a 2-inch [place] from the incision.     Your baby has signs of infection. Signs may include severe swelling; redness; a red streak on the shaft of the penis; or a thick, yellow discharge.   Watch closely for changes in your child's health, and be sure to contact your doctor if:    A Plastibell device was used for the circumcision and the ring has not fallen off after 10 to 12 days.   Where can you learn more?  Go to https://www.Tradescape.net/patiented  Enter S255 in the search box to learn more about \"Circumcision in Infants: What to Expect at Home.\"  Current as of: March 1, 2023               Content Version: 13.7    4725-1476 [employer].   Care instructions adapted under license by your healthcare professional. If you have questions about a medical condition or this instruction, always ask your healthcare professional. [employer] disclaims any warranty or liability for your use of this information.

## 2023-09-12 NOTE — PROGRESS NOTES
Subjective:      33 year old  at 34w3d presentst for a routine prenatal appointment.    No  vaginal bleeding or leakage of fluid.  Not feeling  contractions. Normal  fetal movement.       No HA, visual changes, RUQ or epigastric pain.   Patient concerns: stopped using unisom last visit now only sleeping 3-4 hr at night    Feeling well overall.  GDM  v Pregestational diabetes  checking BS  all FBS < 95  1 hr PP less 140  pt has on phone  exercise makes them lower     Had growth US  15%  has rpeat growth  at [facility]   .  Reviewed TDAP Previously given  Objective:  BP 99/61   Pulse 65   Wt 57.2 kg (126 lb)   LMP 2023   BMI 25.44 kg/m   Assessment/Plan     Encounter Diagnoses   Name Primary?    High risk pregnancy, antepartum Yes    Diet controlled gestational diabetes mellitus (GDM) vs pre-gestational, early 1 hour at 14+3 =200      Antibody Screen   Date Value Ref Range Status   2023 Negative Negative Final   , Rhogam  was not given.    - Reviewed total weight gain, encouraged continued healthy diet and exercise.  .  Reviewed importance of daily fetal kick count and why/how to contact provider.    - Reviewed why/how to contact provider if headache/visual changes/RUQ or epigastric pain, decreased fetal movement, vaginal bleeding, leakage of fluid or more than 4 contractions in an hour.     Patient education/orders or handouts today:reviewed birth place tour  how to get there signs and symptoms to call with   PTL signs/symptoms  see AVS   Return to clinic in 2 weeks and prn if questions or concerns.     JARED Leonardo CNM

## 2023-09-12 NOTE — NURSING NOTE
Chief Complaint   Patient presents with    Prenatal Care     LAWANDA 34 weeks and 3 days    Jeannie Peace LPN

## 2023-09-22 ENCOUNTER — OFFICE VISIT (OUTPATIENT)
Dept: MATERNAL FETAL MEDICINE | Facility: CLINIC | Age: 33
End: 2023-09-22
Attending: OBSTETRICS & GYNECOLOGY
Payer: COMMERCIAL

## 2023-09-22 ENCOUNTER — HOSPITAL ENCOUNTER (OUTPATIENT)
Dept: ULTRASOUND IMAGING | Facility: CLINIC | Age: 33
Discharge: HOME | End: 2023-09-22
Attending: OBSTETRICS & GYNECOLOGY
Payer: COMMERCIAL

## 2023-09-22 DIAGNOSIS — O24.410 DIET CONTROLLED GESTATIONAL DIABETES MELLITUS (GDM) IN THIRD TRIMESTER: ICD-10-CM

## 2023-09-22 DIAGNOSIS — O24.410 DIET CONTROLLED GESTATIONAL DIABETES MELLITUS (GDM) IN SECOND TRIMESTER: Primary | ICD-10-CM

## 2023-09-22 PROCEDURE — 76816 OB US FOLLOW-UP PER FETUS: CPT

## 2023-09-22 PROCEDURE — 76816 OB US FOLLOW-UP PER FETUS: CPT | Mod: 26 | Performed by: OBSTETRICS & GYNECOLOGY

## 2023-09-22 NOTE — NURSING NOTE
IPAD  used for visit.  Patient presents for RL2 growth US for GDMA1.  Patient reports positive fetal movement, no pain, no regular contractions, leaking of fluid, or bleeding.  Reports blood sugar values fasting 85 this morning, within range on diet control.  Patient denies headache, visual changes, nausea/vomiting, epigastric pain related to preeclampsia.  SBAR given to CHANDNI COLON, see their note in Epic.

## 2023-09-22 NOTE — PROGRESS NOTES
"Please see \"Imaging\" tab under \"Chart Review\" for details of today's visit.    Helene Brooks MD PhD  Maternal Fetal Medicine     "

## 2023-09-26 ENCOUNTER — PRENATAL OFFICE VISIT (OUTPATIENT)
Dept: OBGYN | Facility: CLINIC | Age: 33
End: 2023-09-26
Attending: MIDWIFE
Payer: COMMERCIAL

## 2023-09-26 ENCOUNTER — APPOINTMENT (OUTPATIENT)
Dept: LAB | Facility: CLINIC | Age: 33
End: 2023-09-26
Attending: MIDWIFE
Payer: COMMERCIAL

## 2023-09-26 VITALS
SYSTOLIC BLOOD PRESSURE: 94 MMHG | HEART RATE: 80 BPM | DIASTOLIC BLOOD PRESSURE: 60 MMHG | WEIGHT: 127 LBS | HEIGHT: 59 IN | BODY MASS INDEX: 25.6 KG/M2

## 2023-09-26 DIAGNOSIS — O24.410 DIET CONTROLLED GESTATIONAL DIABETES MELLITUS (GDM) IN SECOND TRIMESTER: ICD-10-CM

## 2023-09-26 DIAGNOSIS — O09.90 HIGH RISK PREGNANCY, ANTEPARTUM: Primary | ICD-10-CM

## 2023-09-26 LAB
ERYTHROCYTE [DISTWIDTH] IN BLOOD BY AUTOMATED COUNT: 12.8 % (ref 10–15)
HCT VFR BLD AUTO: 36.1 % (ref 35–47)
HGB BLD-MCNC: 12.5 G/DL (ref 11.7–15.7)
MCH RBC QN AUTO: 30.9 PG (ref 26.5–33)
MCHC RBC AUTO-ENTMCNC: 34.6 G/DL (ref 31.5–36.5)
MCV RBC AUTO: 89 FL (ref 78–100)
PLATELET # BLD AUTO: 159 10E3/UL (ref 150–450)
RBC # BLD AUTO: 4.04 10E6/UL (ref 3.8–5.2)
WBC # BLD AUTO: 8.6 10E3/UL (ref 4–11)

## 2023-09-26 PROCEDURE — 99207 PR PRENATAL VISIT: CPT | Performed by: MIDWIFE

## 2023-09-26 PROCEDURE — 87653 STREP B DNA AMP PROBE: CPT | Performed by: MIDWIFE

## 2023-09-26 PROCEDURE — 90686 IIV4 VACC NO PRSV 0.5 ML IM: CPT

## 2023-09-26 PROCEDURE — 250N000011 HC RX IP 250 OP 636

## 2023-09-26 PROCEDURE — 85048 AUTOMATED LEUKOCYTE COUNT: CPT | Performed by: MIDWIFE

## 2023-09-26 PROCEDURE — G0463 HOSPITAL OUTPT CLINIC VISIT: HCPCS | Mod: 25 | Performed by: MIDWIFE

## 2023-09-26 PROCEDURE — G0008 ADMIN INFLUENZA VIRUS VAC: HCPCS

## 2023-09-26 PROCEDURE — 36415 COLL VENOUS BLD VENIPUNCTURE: CPT | Performed by: MIDWIFE

## 2023-09-26 RX ORDER — ACETAMINOPHEN 325 MG/1
650 TABLET ORAL EVERY 6 HOURS PRN
Qty: 100 TABLET | Refills: 0 | Status: SHIPPED | OUTPATIENT
Start: 2023-09-26

## 2023-09-26 RX ORDER — IBUPROFEN 600 MG/1
600 TABLET, FILM COATED ORAL EVERY 6 HOURS PRN
Qty: 60 TABLET | Refills: 0 | Status: SHIPPED | OUTPATIENT
Start: 2023-09-26

## 2023-09-26 RX ORDER — AMOXICILLIN 250 MG
1 CAPSULE ORAL DAILY
Qty: 100 TABLET | Refills: 0 | Status: SHIPPED | OUTPATIENT
Start: 2023-09-26

## 2023-09-26 NOTE — PATIENT INSTRUCTIONS
Thank you for trusting us with your care!     If you need to contact us for questions about:  Symptoms, Scheduling & Medical Questions; Non-urgent (2-3 day response) [place] message, Urgent (needing response today) [phone] (if after 3:30pm next day response)   Prescriptions: Please call your Pharmacy   Billing: [facility] [phone] or [facility]:[phone]

## 2023-09-26 NOTE — PROGRESS NOTES
"Subjective:      33 year old  at 36w3d presents for a routine prenatal appointment.         Denies  vaginal bleeding,  leakage of fluid, or change in vaginal discharge.  Occ  contractions.  Normal  fetal movement.     No HA, visual changes, RUQ or epigastric pain.   Patient concerns: here with spouse using  via IPAD   Feeling well overall.   Had repeat Growth  5#7oz  20 %  no further unless requires insulin  All 1 hr PP < 140  all FBS < 95    PP OTC meds sent      Objective:  Vitals:    23 1052   BP: 94/60   Pulse: 80   Weight: 57.6 kg (127 lb)   Height: 1.499 m (4' 11\")    See OB flowsheet    Assessment/Plan     Encounter Diagnoses   Name Primary?    High risk pregnancy, antepartum Yes    Diet controlled gestational diabetes mellitus (GDM) vs pre-gestational, early 1 hour at 14+3 =200      Orders Placed This Encounter   Procedures    CBC with Platelets     GBS collected   Orders Placed This Encounter   Medications    senna-docusate (SENOKOT-S/PERICOLACE) 8.6-50 MG tablet     Sig: Take 1 tablet by mouth daily Start after delivery.     Dispense:  100 tablet     Refill:  0    ibuprofen (ADVIL/MOTRIN) 600 MG tablet     Sig: Take 1 tablet (600 mg) by mouth every 6 hours as needed for moderate pain Start after delivery     Dispense:  60 tablet     Refill:  0    acetaminophen (TYLENOL) 325 MG tablet     Sig: Take 2 tablets (650 mg) by mouth every 6 hours as needed for mild pain Start after Delivery.     Dispense:  100 tablet     Refill:  0           2023    10:35 AM 2023    10:36 AM   PHQ-9 SCORE   PHQ-9 Total Score 8 8     [unfilled]  GBS screening: Obtained.  Birth preferences reviewed: Un-Medicated    Labor signs discussed. Reinforced daily fetal movement counts.  Reviewed why/how to contact provider if headache/visual changes/RUQ or epigastric pain, decreased fetal movement, vaginal bleeding, leakage of fluid.   Return to clinic in 1 week and prn if questions or concerns. "     Claire Ayala, APRN CNM

## 2023-09-27 LAB — GP B STREP DNA SPEC QL NAA+PROBE: NEGATIVE

## 2023-10-02 NOTE — PATIENT INSTRUCTIONS
Thank you for trusting us with your care!     If you need to contact us for questions about:  Symptoms, Scheduling & Medical Questions; Non-urgent (2-3 day response) [place] message, Urgent (needing response today) [phone] (if after 3:30pm next day response)   Prescriptions: Please call your Pharmacy   Billing: [facility] [phone] or [facility]:[phone]    Week 37 of Your Pregnancy: Care Instructions    Most babies are born between 37 and 40 weeks.   This is a good time to pack a bag to take with you to the birth. Then it will be ready to go when you are.     Learn about breastfeeding.  For example, find out about ways to hold your baby to make breastfeeding easier. And think about learning how to pump and store milk.     Know that crying is normal.  It's common for babies to cry 1 to 3 hours a day. Some cry more, and some cry less.     Learn why babies cry.  They may be hungry; have gas; need a diaper change; or feel cold, warm, tired, lonely, or tense. Sometimes they cry for unknown reasons.     Think about what will help you stay calm when your baby cries.  Taking slow, deep breaths can help. So can taking a break. It's okay to put your baby somewhere safe (like their crib) and walk away for a few minutes.     Learn about safe sleep for your baby.  Always put your baby to sleep on their back. Place them alone in a crib or bassinet with a firm, flat surface. Keep soft items like stuffed animals out of the crib.     Learn what to expect with  poop.  Your baby will have their own bowel patterns. Some babies have several bowel movements a day. Some have fewer.     Know that  babies will often have loose, yellow bowel movements.  Formula-fed babies have more formed stools. If your baby's poop looks like pellets, your baby is constipated.   Follow-up care is a key part of your treatment and safety. Be sure to make and go to all appointments, and call your doctor if you are having problems.  "It's also a good idea to know your test results and keep a list of the medicines you take.  Where can you learn more?  Go to https://www.[employer].net/patiented  Enter N257 in the search box to learn more about \"Week 37 of Your Pregnancy: Care Instructions.\"  Current as of: November 9, 2022               Content Version: 13.7    8867-8623 [employer].   Care instructions adapted under license by your healthcare professional. If you have questions about a medical condition or this instruction, always ask your healthcare professional. [employer] disclaims any warranty or liability for your use of this information.    .whs  "

## 2023-10-03 ENCOUNTER — PRENATAL OFFICE VISIT (OUTPATIENT)
Dept: OBGYN | Facility: CLINIC | Age: 33
End: 2023-10-03
Attending: ADVANCED PRACTICE MIDWIFE
Payer: COMMERCIAL

## 2023-10-03 VITALS
BODY MASS INDEX: 25.96 KG/M2 | HEIGHT: 59 IN | HEART RATE: 85 BPM | SYSTOLIC BLOOD PRESSURE: 92 MMHG | DIASTOLIC BLOOD PRESSURE: 58 MMHG | WEIGHT: 128.8 LBS

## 2023-10-03 DIAGNOSIS — Z78.9 NOT IMMUNE TO HEPATITIS B VIRUS: ICD-10-CM

## 2023-10-03 DIAGNOSIS — E55.9 VITAMIN D DEFICIENCY: ICD-10-CM

## 2023-10-03 DIAGNOSIS — R03.0 ELEVATED BP WITHOUT DIAGNOSIS OF HYPERTENSION: ICD-10-CM

## 2023-10-03 DIAGNOSIS — O09.90 HIGH RISK PREGNANCY, ANTEPARTUM: Primary | ICD-10-CM

## 2023-10-03 DIAGNOSIS — O24.410 DIET CONTROLLED GESTATIONAL DIABETES MELLITUS (GDM) IN SECOND TRIMESTER: ICD-10-CM

## 2023-10-03 DIAGNOSIS — Z83.3 FAMILY HISTORY OF DIABETES MELLITUS: ICD-10-CM

## 2023-10-03 PROCEDURE — 99207 PR PRENATAL VISIT: CPT | Performed by: ADVANCED PRACTICE MIDWIFE

## 2023-10-03 PROCEDURE — 99213 OFFICE O/P EST LOW 20 MIN: CPT | Performed by: ADVANCED PRACTICE MIDWIFE

## 2023-10-03 NOTE — PROGRESS NOTES
"Subjective:     33 year old  at 37w3d presents for routine prenatal visit. Here w             no vaginal bleeding or leakage of fluid.  no contractions.  pos fetal movement.        No HA, visual changes, RUQ or epigastric pain.   Patient concerns:   Feeling well overall.  Doing well w diet changes  Objective:  Vitals:    10/03/23 1039   BP: 92/58   Pulse: 85   Weight: 58.4 kg (128 lb 12.8 oz)   Height: 1.499 m (4' 11.02\")    See OB flowsheet    BS reviewed on phone SHARA.  FBS all 80s, 2 FBS at 93  All 1hr BS under 140  Assessment/Plan     Encounter Diagnoses   Name Primary?    High risk pregnancy, antepartum Yes    Family history of diabetes mellitus- Mom with \"prediabetes\" on meds     Diet controlled gestational diabetes mellitus (GDM) vs pre-gestational, early 1 hour at 14+3 =200     Elevated BP without diagnosis of hypertension     Not immune to hepatitis B virus.  #1     Vitamin D deficiency      No orders of the defined types were placed in this encounter.    No orders of the defined types were placed in this encounter.    - Reviewed why/how to contact provider if headache/visual changes/RUQ or epigastric pain, decreased fetal movement, vaginal bleeding, leakage of fluid or strong/regular contractions.   Patient education/orders or handouts today:  Sign/symptoms of labor and When to call for labor or other concerns  Return to clinic in 1 week and prn if questions or concerns.   Ladonna Stallings, APRN CNM        "

## 2023-10-10 ENCOUNTER — PRENATAL OFFICE VISIT (OUTPATIENT)
Dept: OBGYN | Facility: CLINIC | Age: 33
End: 2023-10-10
Attending: ADVANCED PRACTICE MIDWIFE
Payer: COMMERCIAL

## 2023-10-10 VITALS
HEART RATE: 80 BPM | DIASTOLIC BLOOD PRESSURE: 56 MMHG | HEIGHT: 59 IN | SYSTOLIC BLOOD PRESSURE: 92 MMHG | BODY MASS INDEX: 26.45 KG/M2 | WEIGHT: 131.2 LBS

## 2023-10-10 DIAGNOSIS — O24.410 DIET CONTROLLED GESTATIONAL DIABETES MELLITUS (GDM) IN SECOND TRIMESTER: ICD-10-CM

## 2023-10-10 DIAGNOSIS — O09.90 HIGH RISK PREGNANCY, ANTEPARTUM: Primary | ICD-10-CM

## 2023-10-10 PROCEDURE — 99213 OFFICE O/P EST LOW 20 MIN: CPT | Performed by: ADVANCED PRACTICE MIDWIFE

## 2023-10-10 PROCEDURE — 99207 PR PRENATAL VISIT: CPT | Performed by: ADVANCED PRACTICE MIDWIFE

## 2023-10-10 NOTE — PATIENT INSTRUCTIONS
Week 38 of Your Pregnancy: Care Instructions  Believe it or not, your baby is almost here. You may notice how your baby responds to sounds, warmth, cold, and light. You may even know what kind of music your baby likes.    Even if you expect a vaginal birth, it's a good idea to learn about  section ().  is the delivery of a baby through a cut (incision) in your belly. It's a major surgery, so it has more risks than a vaginal birth.   During the first 2 weeks after the birth, limit visitors. Don't allow visitors who have colds or infections. Ask visitors to wash their hands before they touch your baby. And never let anyone smoke around your baby.     Know about unplanned C-sections.  Reasons for an unplanned  include labor that slows or stops, signs of distress in your baby, and high blood pressure or other problems for you.     Know about planned C-sections.  If your baby isn't in a head-down position for delivery (breech position), your doctor may plan a . Or you may have a planned  if you're having twins or more.     Get as much help as you can while you're in the hospital.  You can learn about feeding, diapering, and bathing your baby.     Talk to your doctor or midwife about how to care for the umbilical cord stump.  If your baby will be circumcised, also ask about how to care for that.     Ask friends or family for help, as you need it.  If you can, have another adult in your home for at least 2 or 3 days after the birth.     Try to nap when your baby naps.  This may be your best chance to get some sleep.     Watch for changes in your mental health.  For the first 1 to 2 weeks after birth, it's common to cry or feel sad or irritable. If these feelings last for more than 2 weeks, you may have postpartum depression. Ask your doctor for help. Postpartum depression can be treated.   Follow-up care is a key part of your treatment and safety. Be sure to make and go  "to all appointments, and call your doctor if you are having problems. It's also a good idea to know your test results and keep a list of the medicines you take.  Where can you learn more?  Go to https://www.[employer].net/patiented  Enter B044 in the search box to learn more about \"Week 38 of Your Pregnancy: Care Instructions.\"  Current as of: 2022               Content Version: 13.7    8137-1495 [employer].   Care instructions adapted under license by your healthcare professional. If you have questions about a medical condition or this instruction, always ask your healthcare professional. [employer] disclaims any warranty or liability for your use of this information.      Week 39 of Your Pregnancy: Care Instructions    [place] babies can look different than what you see in pictures or movies. Their heads can be a strange shape right after birth. And they may have swollen eyes and red marks on their faces.   You can still get pregnant even if you are breastfeeding. If you don't want to get pregnant, talk to your doctor about birth control.   Tips for week 39 of pregnancy  If you plan to breastfeed, get prepared.     Continue to eat healthy foods.  Keep taking your prenatal vitamins during breastfeeding if your doctor recommends it.  Talk to your doctor before taking any medicines or supplements.  Choose the right birth control for you.     Intrauterine devices (IUDs) are placed in the uterus. Sometimes the IUD can be placed right after giving birth. They work for years.  Hormonal implants are placed under the skin of the arm. They also work for years.  Depo-Provera is a shot. You get it every 3 months.  Birth control pills can be used. They're taken every day.  Tubal ligation (tying your tubes) and vasectomy are surgeries. They're permanent.  Diaphragms, spermicide, and condoms must be used each time you have sex. If you used a diaphragm before, you should get refitted after " "the baby is born.  A birth control patch or ring can be used. These just can't be started until several weeks after you give birth.  Follow-up care is a key part of your treatment and safety. Be sure to make and go to all appointments, and call your doctor if you are having problems. It's also a good idea to know your test results and keep a list of the medicines you take.  Where can you learn more?  Go to https://www.[employer].net/patiented  Enter A811 in the search box to learn more about \"Week 39 of Your Pregnancy: Care Instructions.\"  Current as of: 2022               Content Version: 13.7    8609-7527 [employer].   Care instructions adapted under license by your healthcare professional. If you have questions about a medical condition or this instruction, always ask your healthcare professional. [employer] disclaims any warranty or liability for your use of this information.      Weeks 40 to 41 of Your Pregnancy: Care Instructions  By week 40, you've reached your due date. Your baby could be coming any day. Most babies born after their due dates are healthy. But you may have tests to make sure everything is okay. If you feel stressed, you could try a meditation exercise, such as guided imagery. It may help you relax.    If you are past 41 weeks, your doctor may measure the amount of fluid that surrounds your baby. They may also test your baby's movement and heart rate.   If you don't start labor on your own by 41 or 42 weeks, your doctor may want to start (induce) labor. If there are other concerns, your doctor may talk to you about a .   To start (induce) your labor, your doctor may do any of these things.    Place a narrow tube with a small balloon on the end (balloon catheter) into your cervix.  The doctor inflates the balloon. This helps your cervix open (dilate).     Sweep the membranes (separate the lining of the amniotic sac from the uterus).  This helps " "the uterus make a chemical that can start contractions.     \"Break your water\" (rupture the amniotic sac).  This may be done if your cervix has started to open.     Use medicines.  They may be used to soften the cervix or start contractions.   How to do guided imagery    Close your eyes, and take a few deep breaths. Picture a peaceful setting, such as a beach or a meadow. Add a winding path. Follow the path until you feel more and more relaxed.   Take a few minutes to breathe slowly and feel the calm. When you are ready, slowly take yourself back to the present. Count to 3, and open your eyes.   Follow-up care is a key part of your treatment and safety. Be sure to make and go to all appointments, and call your doctor if you are having problems. It's also a good idea to know your test results and keep a list of the medicines you take.  Where can you learn more?  Go to https://www.[employer].Sypherlink/patiented  Enter T922 in the search box to learn more about \"Weeks 40 to 41 of Your Pregnancy: Care Instructions.\"  Current as of: November 9, 2022               Content Version: 13.7    4627-4445 [employer]   Care instructions adapted under license by your healthcare professional. If you have questions about a medical condition or this instruction, always ask your healthcare professional. [employer] disclaims any warranty or liability for your use of this information.      Labor Induction  What You Need to Know  What is labor induction?  In most cases, it is best to go into labor naturally. When labor does not start on its own, we may use medicine or other methods to start (induce) labor. This is called induction, which:  Helps the uterus contract  Thins, softens and opens the cervix (opening of the uterus)  When is induction used?  There are two types of induction.  Medical induction may be done to protect the health of the parent or baby if:  There are medical concerns for you or your baby.  You " "haven't had your baby by 41 weeks.  Induction for non-medical reasons may be done at 39 weeks or later if:  You live far from the hospital.  You've been having contractions for many days.  You've given birth quickly in the past.   We will not perform an induction for non-medical reasons before 39 weeks. Studies show that babies born before 39 weeks may struggle with breathing, feeding, sleeping and staying warm. They are more likely to have health problems and may need to stay in the hospital longer. If we start your labor for medical reasons, the benefits will outweigh these risks. We will talk to you about your risks, benefits and alternatives (other options) before we start your labor.  How is induction done?  We may start your labor by doing one or more of the following:  We may need to help your cervix soften and open (sometimes called \"ripening\") to prepare it for labor. There are two ways to do this:  Medicines--these may be in the form of pills that you swallow or medicines that are put into the vagina next to the cervix.  Mechanical--using either a single or double balloon. These are small balloons that are attached to tubes that go up inside the cervix. The balloons are then filled with water to put pressure on the cervix and help the cervix soften and open. Depending on the type of balloon used, you may be allowed to go home after it is placed.  After your cervix is ready:  We may give you medicine through an IV (a small tube placed in your vein). This medicine is called Pitocin. It helps the muscles of your uterus to contract.  We may make a small opening in your bag of water (the sac around your baby). This is called an amniotomy. Sometimes called \"breaking the water\". It may help your uterus contract and your cervix open.  What might happen if my labor is induced?  Some of this depends on how your labor is started and how your body responds. Your labor may be more complicated. You and your baby may " need more medical treatments. In general:  You may not go into labor right away. If so, we may send you home with follow-up instructions.  It will be important to monitor you and your baby during the induction.  You may not be able to move around during labor. You will have two belts with monitors attached to your belly. These allow the nurse to watch your contractions and your baby's heart rate.  Your labor may take longer than if you went into labor on your own. It might take more than one day.  If you need cervical ripening, it is important to know that it may be many hours (even days) until delivery happens.  Cervical ripening can be slow and require several doses before your body is ready to labor.  A long labor may increase the risk of infection in mother and baby.  Your labor may not progress as planned. This could lead to a  birth.  Can I plan the date of my delivery?  After 39 weeks, you may ask about planning your delivery date. This is only an option if your body--and your baby--are ready. To find out, we will check your cervix and your baby's heart rate.   If you are ready to be induced, we will give you a date and time to come to the hospital. If many patients are in labor that day, we may need to start your labor at another time.  If you are not ready, we will not start your labor. It will be safer for your baby to come on its own.  What else do I need to know?  Before you have an induction, make sure you understand the reasons, risks and benefits. Ask your doctor or nurse-midwife:  Why do I need to be induced?  What are the risks to my baby?  How will you start my labor?  How will you know if my baby is ready to be born?  How will you know if my body is ready to give birth?  Where can I get more information?  Thank you for trusting us with your care!     If you need to contact us for questions about:  Symptoms, Scheduling & Medical Questions; Non-urgent (2-3 day response) [employer]Stuff message,  Urgent (needing response today) [phone] (if after 3:30pm next day response)   Prescriptions: Please call your Pharmacy   Billing: [facility] [phone] or  Physicians:[phone]    To learn more about induction, you may visit these websites:  The American College of Nurse-Midwives:  www.mymidwife.org  The American College of Obstetricians and Gynecologists: www.acog.org  Childbirth Connection:  www.childbirthconnection.org  March of Dimes: www.marchofdimes.com  Association of Women's Health, Obstetrics, and  Nursing  www.jvdofp6cdd.org/go-the-full-40&#047;  For informational purposes only. Not to replace the advice of your health care provider. Copyright   2008 [employer]. All rights reserved. Clinically reviewed by the  System Operations Leadership team. [employer] [id number] - REV .

## 2023-10-10 NOTE — PROGRESS NOTES
"Subjective:     33 year old  at 38w3d presents for routine prenatal visit.   Denies vaginal bleeding or leakage of fluid.  Notes occasional cramping/contractions.  + fetal movement.       No HA, visual changes, RUQ or epigastric pain.   Patient concerns: Feeling well overall.   - GDM, taking fasting and 1 hr PP, all values from past week within normal range.    - GBS neg    - Questions re: induction recommendation    Objective:  Vitals:    10/10/23 1008   BP: 92/56   Pulse: 80   Weight: 59.5 kg (131 lb 3.2 oz)   Height: 1.499 m (4' 11\")   See OB flowsheet    Assessment/Plan:  Encounter Diagnoses   Name Primary?    High risk pregnancy, antepartum Yes    Diet controlled gestational diabetes mellitus (GDM) vs pre-gestational, early 1 hour at 14+3 =200      - Reviewed why/how to contact provider if headache/visual changes/RUQ or epigastric pain, decreased fetal movement, vaginal bleeding, leakage of fluid or strong/regular contractions.  - Patient education/orders or handouts today: Sign/symptoms of labor, When to call for labor or other concerns, and Induction of labor  - Reviewed recommendation with GDMA1 for IOL between 39w-40w6d.  Discussed well-controlled GDM and option to wait for spontaneous labor until 40w6d if desired.  Pt and partner undecided at this time. Will continue conversation at future visits.  - Plan for BPP/growth at 40w  - Return to clinic in 1 week and prn if questions or concerns.     JARED Schmitt CNM  "

## 2023-10-16 NOTE — PATIENT INSTRUCTIONS
"Week 39 of Your Pregnancy: Care Instructions     babies can look different than what you see in pictures or movies. Their heads can be a strange shape right after birth. And they may have swollen eyes and red marks on their faces.   You can still get pregnant even if you are breastfeeding. If you don't want to get pregnant, talk to your doctor about birth control.   Tips for week 39 of pregnancy  If you plan to breastfeed, get prepared.     Continue to eat healthy foods.  Keep taking your prenatal vitamins during breastfeeding if your doctor recommends it.  Talk to your doctor before taking any medicines or supplements.  Choose the right birth control for you.     Intrauterine devices (IUDs) are placed in the uterus. Sometimes the IUD can be placed right after giving birth. They work for years.  Hormonal implants are placed under the skin of the arm. They also work for years.  Depo-Provera is a shot. You get it every 3 months.  Birth control pills can be used. They're taken every day.  Tubal ligation (tying your tubes) and vasectomy are surgeries. They're permanent.  Diaphragms, spermicide, and condoms must be used each time you have sex. If you used a diaphragm before, you should get refitted after the baby is born.  A birth control patch or ring can be used. These just can't be started until several weeks after you give birth.  Follow-up care is a key part of your treatment and safety. Be sure to make and go to all appointments, and call your doctor if you are having problems. It's also a good idea to know your test results and keep a list of the medicines you take.  Where can you learn more?  Go to https://www.[employer].net/patiented  Enter A811 in the search box to learn more about \"Week 39 of Your Pregnancy: Care Instructions.\"  Current as of: 2022               Content Version: 13.7    6506-3563 [employer].   Care instructions adapted under license by your healthcare " "professional. If you have questions about a medical condition or this instruction, always ask your healthcare professional. [employer] disclaims any warranty or liability for your use of this information.      Weeks 40 to 41 of Your Pregnancy: Care Instructions  By week 40, you've reached your due date. Your baby could be coming any day. Most babies born after their due dates are healthy. But you may have tests to make sure everything is okay. If you feel stressed, you could try a meditation exercise, such as guided imagery. It may help you relax.    If you are past 41 weeks, your doctor may measure the amount of fluid that surrounds your baby. They may also test your baby's movement and heart rate.   If you don't start labor on your own by 41 or 42 weeks, your doctor may want to start (induce) labor. If there are other concerns, your doctor may talk to you about a .   To start (induce) your labor, your doctor may do any of these things.    Place a narrow tube with a small balloon on the end (balloon catheter) into your cervix.  The doctor inflates the balloon. This helps your cervix open (dilate).     Sweep the membranes (separate the lining of the amniotic sac from the uterus).  This helps the uterus make a chemical that can start contractions.     \"Break your water\" (rupture the amniotic sac).  This may be done if your cervix has started to open.     Use medicines.  They may be used to soften the cervix or start contractions.   How to do guided imagery    Close your eyes, and take a few deep breaths. Picture a peaceful setting, such as a beach or a meadow. Add a winding path. Follow the path until you feel more and more relaxed.   Take a few minutes to breathe slowly and feel the calm. When you are ready, slowly take yourself back to the present. Count to 3, and open your eyes.   Follow-up care is a key part of your treatment and safety. Be sure to make and go to all appointments, and call " "your doctor if you are having problems. It's also a good idea to know your test results and keep a list of the medicines you take.  Where can you learn more?  Go to https://www.[employer].net/patiented  Enter T922 in the search box to learn more about \"Weeks 40 to 41 of Your Pregnancy: Care Instructions.\"  Current as of: November 9, 2022               Content Version: 13.7    4055-7103 [employer].   Care instructions adapted under license by your healthcare professional. If you have questions about a medical condition or this instruction, always ask your healthcare professional. [employer] disclaims any warranty or liability for your use of this information.Thank you for trusting us with your care!     If you need to contact us for questions about:  Symptoms, Scheduling & Medical Questions; Non-urgent (2-3 day response) [email or web address] message, Urgent (needing response today) [phone] (if after 3:30pm next day response)   Prescriptions: Please call your Pharmacy   Billing: [facility] [phone] or  Sandrita:[phone]   "

## 2023-10-17 ENCOUNTER — PRENATAL OFFICE VISIT (OUTPATIENT)
Dept: OBGYN | Facility: CLINIC | Age: 33
End: 2023-10-17
Attending: ADVANCED PRACTICE MIDWIFE
Payer: COMMERCIAL

## 2023-10-17 VITALS
BODY MASS INDEX: 26.22 KG/M2 | SYSTOLIC BLOOD PRESSURE: 95 MMHG | HEART RATE: 72 BPM | WEIGHT: 129.8 LBS | DIASTOLIC BLOOD PRESSURE: 67 MMHG

## 2023-10-17 DIAGNOSIS — E55.9 VITAMIN D DEFICIENCY: ICD-10-CM

## 2023-10-17 DIAGNOSIS — O09.90 HIGH RISK PREGNANCY, ANTEPARTUM: Primary | ICD-10-CM

## 2023-10-17 DIAGNOSIS — R03.0 ELEVATED BP WITHOUT DIAGNOSIS OF HYPERTENSION: ICD-10-CM

## 2023-10-17 DIAGNOSIS — O24.410 DIET CONTROLLED GESTATIONAL DIABETES MELLITUS (GDM) IN SECOND TRIMESTER: ICD-10-CM

## 2023-10-17 PROCEDURE — 99207 PR PRENATAL VISIT: CPT | Performed by: ADVANCED PRACTICE MIDWIFE

## 2023-10-17 PROCEDURE — 99213 OFFICE O/P EST LOW 20 MIN: CPT | Performed by: ADVANCED PRACTICE MIDWIFE

## 2023-10-17 ASSESSMENT — PAIN SCALES - GENERAL: PAINLEVEL: NO PAIN (0)

## 2023-10-17 NOTE — PROGRESS NOTES
Subjective:     33 year old  at 39w3d presents for routine prenatal visit.            no vaginal bleeding or leakage of fluid.  some contractions.  pos fetal movement.        No HA, visual changes, RUQ or epigastric pain.   Patient concerns:   Feeling well overall.  All BS nml in the past week. One low after long walk, ate fruit and BS increased to normal  Objective:  Vitals:    10/17/23 1005   BP: 95/67   Pulse: 72   Weight: 58.9 kg (129 lb 12.8 oz)    See OB flowsheet  Assessment/Plan     Encounter Diagnoses   Name Primary?    High risk pregnancy, antepartum Yes    Vitamin D deficiency     Diet controlled gestational diabetes mellitus (GDM) vs pre-gestational, early 1 hour at 14+3 =200     Elevated BP without diagnosis of hypertension      No orders of the defined types were placed in this encounter.    No orders of the defined types were placed in this encounter.       Patient desires  induction on 10/20 at 0730  - Reviewed why/how to contact provider if headache/visual changes/RUQ or epigastric pain, decreased fetal movement, vaginal bleeding, leakage of fluid or strong/regular contractions.   Patient education/orders or handouts today:  Sign/symptoms of labor, When to call for labor or other concerns, and Induction of labor  Return to clinic in 1 week and prn if questions or concerns.   Gave map of hosp location  JARED Zavala CNM

## 2023-10-17 NOTE — NURSING NOTE
39.3 weeks ob visit   some nausea today.   Low blood sugars this week   lows 83  86  87  fastings in 130's

## 2023-10-20 ENCOUNTER — HOSPITAL ENCOUNTER (INPATIENT)
Facility: CLINIC | Age: 33
LOS: 3 days | Discharge: HOME HEALTH | End: 2023-10-23
Attending: ADVANCED PRACTICE MIDWIFE | Admitting: ADVANCED PRACTICE MIDWIFE
Payer: COMMERCIAL

## 2023-10-20 ENCOUNTER — ANESTHESIA EVENT (OUTPATIENT)
Dept: OBGYN | Facility: CLINIC | Age: 33
End: 2023-10-20
Payer: COMMERCIAL

## 2023-10-20 ENCOUNTER — ANESTHESIA (OUTPATIENT)
Dept: OBGYN | Facility: CLINIC | Age: 33
End: 2023-10-20
Payer: COMMERCIAL

## 2023-10-20 LAB
ABO/RH(D): NORMAL
ANTIBODY SCREEN: NEGATIVE
ERYTHROCYTE [DISTWIDTH] IN BLOOD BY AUTOMATED COUNT: 13.7 % (ref 10–15)
GLUCOSE BLDC GLUCOMTR-MCNC: 117 MG/DL (ref 70–99)
GLUCOSE BLDC GLUCOMTR-MCNC: 120 MG/DL (ref 70–99)
GLUCOSE BLDC GLUCOMTR-MCNC: 126 MG/DL (ref 70–99)
GLUCOSE BLDC GLUCOMTR-MCNC: 171 MG/DL (ref 70–99)
HCT VFR BLD AUTO: 38 % (ref 35–47)
HGB BLD-MCNC: 13.1 G/DL (ref 11.7–15.7)
MCH RBC QN AUTO: 31.3 PG (ref 26.5–33)
MCHC RBC AUTO-ENTMCNC: 34.5 G/DL (ref 31.5–36.5)
MCV RBC AUTO: 91 FL (ref 78–100)
PLATELET # BLD AUTO: 157 10E3/UL (ref 150–450)
RBC # BLD AUTO: 4.19 10E6/UL (ref 3.8–5.2)
SARS-COV-2 RNA RESP QL NAA+PROBE: NEGATIVE
SPECIMEN EXPIRATION DATE: NORMAL
T PALLIDUM AB SER QL: NONREACTIVE
WBC # BLD AUTO: 7.7 10E3/UL (ref 4–11)

## 2023-10-20 PROCEDURE — 250N000011 HC RX IP 250 OP 636: Performed by: ADVANCED PRACTICE MIDWIFE

## 2023-10-20 PROCEDURE — 120N000002 HC R&B MED SURG/OB UMMC

## 2023-10-20 PROCEDURE — 370N000003 HC ANESTHESIA WARD SERVICE: Performed by: STUDENT IN AN ORGANIZED HEALTH CARE EDUCATION/TRAINING PROGRAM

## 2023-10-20 PROCEDURE — 10H07YZ INSERTION OF OTHER DEVICE INTO PRODUCTS OF CONCEPTION, VIA NATURAL OR ARTIFICIAL OPENING: ICD-10-PCS | Performed by: ADVANCED PRACTICE MIDWIFE

## 2023-10-20 PROCEDURE — 86780 TREPONEMA PALLIDUM: CPT | Performed by: ADVANCED PRACTICE MIDWIFE

## 2023-10-20 PROCEDURE — 87635 SARS-COV-2 COVID-19 AMP PRB: CPT | Performed by: ADVANCED PRACTICE MIDWIFE

## 2023-10-20 PROCEDURE — 36415 COLL VENOUS BLD VENIPUNCTURE: CPT | Performed by: ADVANCED PRACTICE MIDWIFE

## 2023-10-20 PROCEDURE — 250N000013 HC RX MED GY IP 250 OP 250 PS 637: Performed by: ADVANCED PRACTICE MIDWIFE

## 2023-10-20 PROCEDURE — 86901 BLOOD TYPING SEROLOGIC RH(D): CPT | Performed by: ADVANCED PRACTICE MIDWIFE

## 2023-10-20 PROCEDURE — 258N000003 HC RX IP 258 OP 636: Performed by: ADVANCED PRACTICE MIDWIFE

## 2023-10-20 PROCEDURE — 86850 RBC ANTIBODY SCREEN: CPT | Performed by: ADVANCED PRACTICE MIDWIFE

## 2023-10-20 PROCEDURE — C9803 HOPD COVID-19 SPEC COLLECT: HCPCS

## 2023-10-20 PROCEDURE — 85014 HEMATOCRIT: CPT | Performed by: ADVANCED PRACTICE MIDWIFE

## 2023-10-20 PROCEDURE — 250N000011 HC RX IP 250 OP 636

## 2023-10-20 RX ORDER — PROCHLORPERAZINE MALEATE 10 MG
10 TABLET ORAL EVERY 6 HOURS PRN
Status: DISCONTINUED | OUTPATIENT
Start: 2023-10-20 | End: 2023-10-21

## 2023-10-20 RX ORDER — CARBOPROST TROMETHAMINE 250 UG/ML
250 INJECTION, SOLUTION INTRAMUSCULAR
Status: DISCONTINUED | OUTPATIENT
Start: 2023-10-20 | End: 2023-10-21

## 2023-10-20 RX ORDER — FENTANYL CITRATE 50 UG/ML
100 INJECTION, SOLUTION INTRAMUSCULAR; INTRAVENOUS
Status: DISCONTINUED | OUTPATIENT
Start: 2023-10-20 | End: 2023-10-21

## 2023-10-20 RX ORDER — MISOPROSTOL 100 UG/1
25 TABLET ORAL EVERY 4 HOURS PRN
Status: DISCONTINUED | OUTPATIENT
Start: 2023-10-20 | End: 2023-10-21

## 2023-10-20 RX ORDER — DEXTROSE MONOHYDRATE 25 G/50ML
25-50 INJECTION, SOLUTION INTRAVENOUS
Status: DISCONTINUED | OUTPATIENT
Start: 2023-10-20 | End: 2023-10-21

## 2023-10-20 RX ORDER — MISOPROSTOL 200 UG/1
400 TABLET ORAL
Status: DISCONTINUED | OUTPATIENT
Start: 2023-10-20 | End: 2023-10-21

## 2023-10-20 RX ORDER — ACETAMINOPHEN 325 MG/1
650 TABLET ORAL EVERY 4 HOURS PRN
Status: DISCONTINUED | OUTPATIENT
Start: 2023-10-20 | End: 2023-10-21

## 2023-10-20 RX ORDER — NALOXONE HYDROCHLORIDE 0.4 MG/ML
0.4 INJECTION, SOLUTION INTRAMUSCULAR; INTRAVENOUS; SUBCUTANEOUS
Status: DISCONTINUED | OUTPATIENT
Start: 2023-10-20 | End: 2023-10-21

## 2023-10-20 RX ORDER — METHYLERGONOVINE MALEATE 0.2 MG/ML
200 INJECTION INTRAVENOUS
Status: DISCONTINUED | OUTPATIENT
Start: 2023-10-20 | End: 2023-10-21

## 2023-10-20 RX ORDER — OXYTOCIN/0.9 % SODIUM CHLORIDE 30/500 ML
1-24 PLASTIC BAG, INJECTION (ML) INTRAVENOUS CONTINUOUS
Status: DISCONTINUED | OUTPATIENT
Start: 2023-10-20 | End: 2023-10-21

## 2023-10-20 RX ORDER — SODIUM CHLORIDE 9 MG/ML
INJECTION, SOLUTION INTRAVENOUS CONTINUOUS
Status: DISCONTINUED | OUTPATIENT
Start: 2023-10-20 | End: 2023-10-21

## 2023-10-20 RX ORDER — ONDANSETRON 4 MG/1
4 TABLET, ORALLY DISINTEGRATING ORAL EVERY 6 HOURS PRN
Status: DISCONTINUED | OUTPATIENT
Start: 2023-10-20 | End: 2023-10-21

## 2023-10-20 RX ORDER — OXYTOCIN/0.9 % SODIUM CHLORIDE 30/500 ML
PLASTIC BAG, INJECTION (ML) INTRAVENOUS
Status: DISCONTINUED
Start: 2023-10-20 | End: 2023-10-21 | Stop reason: HOSPADM

## 2023-10-20 RX ORDER — NALOXONE HYDROCHLORIDE 0.4 MG/ML
0.2 INJECTION, SOLUTION INTRAMUSCULAR; INTRAVENOUS; SUBCUTANEOUS
Status: DISCONTINUED | OUTPATIENT
Start: 2023-10-20 | End: 2023-10-21

## 2023-10-20 RX ORDER — FENTANYL CITRATE-0.9 % NACL/PF 10 MCG/ML
100 PLASTIC BAG, INJECTION (ML) INTRAVENOUS EVERY 5 MIN PRN
Status: DISCONTINUED | OUTPATIENT
Start: 2023-10-20 | End: 2023-10-21

## 2023-10-20 RX ORDER — LIDOCAINE 40 MG/G
CREAM TOPICAL
Status: DISCONTINUED | OUTPATIENT
Start: 2023-10-20 | End: 2023-10-21

## 2023-10-20 RX ORDER — LIDOCAINE HYDROCHLORIDE 10 MG/ML
INJECTION, SOLUTION EPIDURAL; INFILTRATION; INTRACAUDAL; PERINEURAL
Status: DISCONTINUED
Start: 2023-10-20 | End: 2023-10-21 | Stop reason: HOSPADM

## 2023-10-20 RX ORDER — NICOTINE POLACRILEX 4 MG
15-30 LOZENGE BUCCAL
Status: DISCONTINUED | OUTPATIENT
Start: 2023-10-20 | End: 2023-10-21

## 2023-10-20 RX ORDER — NALBUPHINE HYDROCHLORIDE 20 MG/ML
2.5-5 INJECTION, SOLUTION INTRAMUSCULAR; INTRAVENOUS; SUBCUTANEOUS EVERY 6 HOURS PRN
Status: DISCONTINUED | OUTPATIENT
Start: 2023-10-20 | End: 2023-10-21

## 2023-10-20 RX ORDER — METOCLOPRAMIDE 10 MG/1
10 TABLET ORAL EVERY 6 HOURS PRN
Status: DISCONTINUED | OUTPATIENT
Start: 2023-10-20 | End: 2023-10-21

## 2023-10-20 RX ORDER — KETOROLAC TROMETHAMINE 30 MG/ML
30 INJECTION, SOLUTION INTRAMUSCULAR; INTRAVENOUS
Status: DISCONTINUED | OUTPATIENT
Start: 2023-10-20 | End: 2023-10-21

## 2023-10-20 RX ORDER — OXYTOCIN/0.9 % SODIUM CHLORIDE 30/500 ML
100-340 PLASTIC BAG, INJECTION (ML) INTRAVENOUS CONTINUOUS PRN
Status: DISCONTINUED | OUTPATIENT
Start: 2023-10-20 | End: 2023-10-21

## 2023-10-20 RX ORDER — TRANEXAMIC ACID 10 MG/ML
1 INJECTION, SOLUTION INTRAVENOUS EVERY 30 MIN PRN
Status: DISCONTINUED | OUTPATIENT
Start: 2023-10-20 | End: 2023-10-21

## 2023-10-20 RX ORDER — PROCHLORPERAZINE 25 MG
25 SUPPOSITORY, RECTAL RECTAL EVERY 12 HOURS PRN
Status: DISCONTINUED | OUTPATIENT
Start: 2023-10-20 | End: 2023-10-21

## 2023-10-20 RX ORDER — METOCLOPRAMIDE HYDROCHLORIDE 5 MG/ML
10 INJECTION INTRAMUSCULAR; INTRAVENOUS EVERY 6 HOURS PRN
Status: DISCONTINUED | OUTPATIENT
Start: 2023-10-20 | End: 2023-10-21

## 2023-10-20 RX ORDER — OXYTOCIN 10 [USP'U]/ML
10 INJECTION, SOLUTION INTRAMUSCULAR; INTRAVENOUS
Status: DISCONTINUED | OUTPATIENT
Start: 2023-10-20 | End: 2023-10-21

## 2023-10-20 RX ORDER — OXYTOCIN 10 [USP'U]/ML
INJECTION, SOLUTION INTRAMUSCULAR; INTRAVENOUS
Status: DISCONTINUED
Start: 2023-10-20 | End: 2023-10-21 | Stop reason: HOSPADM

## 2023-10-20 RX ORDER — MISOPROSTOL 200 UG/1
TABLET ORAL
Status: DISCONTINUED
Start: 2023-10-20 | End: 2023-10-21 | Stop reason: HOSPADM

## 2023-10-20 RX ORDER — MISOPROSTOL 200 UG/1
800 TABLET ORAL
Status: DISCONTINUED | OUTPATIENT
Start: 2023-10-20 | End: 2023-10-21

## 2023-10-20 RX ORDER — SODIUM CHLORIDE, SODIUM LACTATE, POTASSIUM CHLORIDE, CALCIUM CHLORIDE 600; 310; 30; 20 MG/100ML; MG/100ML; MG/100ML; MG/100ML
INJECTION, SOLUTION INTRAVENOUS CONTINUOUS PRN
Status: DISCONTINUED | OUTPATIENT
Start: 2023-10-20 | End: 2023-10-21

## 2023-10-20 RX ORDER — OXYTOCIN/0.9 % SODIUM CHLORIDE 30/500 ML
340 PLASTIC BAG, INJECTION (ML) INTRAVENOUS CONTINUOUS PRN
Status: COMPLETED | OUTPATIENT
Start: 2023-10-20 | End: 2023-10-21

## 2023-10-20 RX ORDER — CITRIC ACID/SODIUM CITRATE 334-500MG
30 SOLUTION, ORAL ORAL
Status: DISCONTINUED | OUTPATIENT
Start: 2023-10-20 | End: 2023-10-21

## 2023-10-20 RX ORDER — FENTANYL/ROPIVACAINE/NS/PF 2MCG/ML-.1
PLASTIC BAG, INJECTION (ML) EPIDURAL
Status: DISCONTINUED | OUTPATIENT
Start: 2023-10-20 | End: 2023-10-21

## 2023-10-20 RX ORDER — SODIUM CHLORIDE, SODIUM LACTATE, POTASSIUM CHLORIDE, CALCIUM CHLORIDE 600; 310; 30; 20 MG/100ML; MG/100ML; MG/100ML; MG/100ML
INJECTION, SOLUTION INTRAVENOUS CONTINUOUS
Status: DISCONTINUED | OUTPATIENT
Start: 2023-10-20 | End: 2023-10-21

## 2023-10-20 RX ORDER — IBUPROFEN 800 MG/1
800 TABLET, FILM COATED ORAL
Status: DISCONTINUED | OUTPATIENT
Start: 2023-10-20 | End: 2023-10-21

## 2023-10-20 RX ORDER — ONDANSETRON 2 MG/ML
4 INJECTION INTRAMUSCULAR; INTRAVENOUS EVERY 6 HOURS PRN
Status: DISCONTINUED | OUTPATIENT
Start: 2023-10-20 | End: 2023-10-21

## 2023-10-20 RX ADMIN — Medication: at 22:09

## 2023-10-20 RX ADMIN — MISOPROSTOL 25 MCG: 100 TABLET ORAL at 10:13

## 2023-10-20 RX ADMIN — FENTANYL CITRATE 100 MCG: 50 INJECTION INTRAMUSCULAR; INTRAVENOUS at 15:54

## 2023-10-20 RX ADMIN — SODIUM CHLORIDE, POTASSIUM CHLORIDE, SODIUM LACTATE AND CALCIUM CHLORIDE 500 ML: 600; 310; 30; 20 INJECTION, SOLUTION INTRAVENOUS at 14:53

## 2023-10-20 RX ADMIN — SODIUM CHLORIDE, POTASSIUM CHLORIDE, SODIUM LACTATE AND CALCIUM CHLORIDE 1000 ML: 600; 310; 30; 20 INJECTION, SOLUTION INTRAVENOUS at 21:30

## 2023-10-20 ASSESSMENT — ACTIVITIES OF DAILY LIVING (ADL)
ADLS_ACUITY_SCORE: 18

## 2023-10-20 ASSESSMENT — LIFESTYLE VARIABLES: TOBACCO_USE: 0

## 2023-10-20 NOTE — H&P
"ADMIT NOTE  =================  39w6d    Jennifer Baez is a 33 year old female with an Patient's last menstrual period was 2023. and Estimated Date of Delivery: Oct 21, 2023 [facility]/2023 at 7:59 AM for induction of labor.  Indication: GDM diet controlled.     HPI  ================  Jennifer is here for her induction of labor, supported by partner Sathya. She has been feeling good, excited to meet baby but a little nervous for the induction. She had some contractions overnight but nothing regular or current.  Denies fever, cough, SOB or chest pain. Denies having contact with anyone who is Covid-19 positive  Fetal movement- active  ROM- no.  Vaginal bleeding- none  GBS- negative  FOB- involved, Sathya supportive at bedside    Weight gain- 104lb - 129 lbs, Total weight gain- 24 lbs 14.4oz  Height- 4'11\"   Pregravid BMI - 21.2  First prenatal visit at 12 weeks, Total visits- 14    PROBLEM LIST  =================  Patient Active Problem List    Diagnosis Date Noted    Elevated BP without diagnosis of hypertension 2023     Priority: Medium     : elevated bp x 1- 1st, repeat wnl, pre-e labs___  Will meet GHTN dx if another elevate in pregnancy      Diet controlled gestational diabetes mellitus (GDM) vs pre-gestational, early 1 hour at 14+3 =200 2023     Priority: Medium     - failed early GCT with 200 at 14w3d  Hbg A1c = 4.8  , 5/10 - diabetic ed, 100% of values WNL  23- fastings wnl, daily elevations after breakfast and some lunch. Follow-up with Dr Phan bojorquez insulin__  23 [facility] US Continue  surveillance with fetal growth scans every 4 weeks beginning at 28 weeks.   Recommendation for delivery: GDM A1: [id number]   BS QID WNL    BS   10/3: BS all nml         High risk pregnancy, antepartum 04/15/2023     Priority: Medium     [facility] Patient Provider Group choice: CNM group  Partner's name: Sathya  [lópez]JOSE" "folder  [x]Dating by LMP  [x] 1st trimester screening: MFM referral placed for 1st trimester screening place  [x]Offer AFP after 15 wks  [x]Fetal anatomy US ordered  [x]Rubella immune  [x]Hep B nonimmune  []Pap  [x] No added risk for PRE-E  [x] GDM risk, recommended early GCT and hgb A1C  [x]No need for utox in labor  [x]COVID vaccine completed-declined  _____________________________________  [x]EOB folder  [x]PP Contraception plan: unsure  [x]Labor plans: open  [x]: NO,   [x]Infant feeding plan breast  [x]FLU shot  [x]TDAP given  [x]GCT, -->GDM dx  ________________________________________  [x] OTC PP meds sent  []PP plans, time off, support system discussed, resources offered          Family history of diabetes mellitus- Mom with \"prediabetes\" on meds 04/15/2023     Priority: Medium     Mom with \"prediabetes\" on meds    - hbga1c, discuss early 1 hour at next visit    4/25/2023: early 1 hour glucose ____      Vitamin D deficiency 04/12/2023     Priority: Medium     4/25/2023: started supplementation   8/6/23- Vit d 40- decrease supplement      Not immune to hepatitis B virus. 4/25 #1 04/12/2023     Priority: Medium     4/25/2023: Hep B #1  6/13/23: #2         HISTORIES  ============  No Known Allergies  Past Medical History:   Diagnosis Date    Gestational diabetes      Past Surgical History:   Procedure Laterality Date    ACHILLES TENDON SURGERY Left 2011    no problems   .  Family History   Problem Relation Age of Onset    Diabetes Type 2  Mother         \"pre diabetes\" but on meds    Obesity Mother     No Known Problems Father     Infertility Sister     Hypertension Sister     No Known Problems Sister     No Known Problems Sister     No Known Problems Brother     No Known Problems Brother     Hypertension Maternal Grandmother     Hernia Maternal Grandfather     Hypertension Paternal Grandmother     Cerebrovascular Disease Paternal Grandfather      Social History     Tobacco Use    Smoking status: Never "    Smokeless tobacco: Never   Substance Use Topics    Alcohol use: Not Currently     OB History    Para Term  AB Living   1 0 0 0 0 0   SAB IAB Ectopic Multiple Live Births   0 0 0 0 0      # Outcome Date GA Lbr Rayray/2nd Weight Sex Delivery Anes PTL Lv   1 Current                 LABS:   ===========  Prenatal Labs:  Rhogam not indicated   Lab Results   Component Value Date    AS Negative 2023    RUQIGG Positive 2023    HEPBANG Nonreactive 2023    HGB 12.5 2023    HIAGAB Nonreactive 2023    GLU1 200 (H) 2023     Rubella : immune  GBS : negative 23    Other labs:   10/20/23 - Fasting blood sugar 84  10/20/23 - Covid pending    ROS  =========  Pt denies significant respiratory, cardiovacular, GI, or muscular/skeletalcomplaints.    See RN data base ROS.     PHYSICAL EXAM:  ===============  LMP 2023    General appearance: comfortable  GENERAL APPEARANCE: healthy, alert and no distress  RESP: normal respiratory effort  CV: regular rates, no varicosities  ABDOMEN:  soft, nontender, no epigastric pain  SKIN: no suspicious lesions or rashes  NEURO: Denies headache, blurred vision, other vision changes  PSYCH: mentation appears normal. and affect normal/bright  Legs: no edema    Abdomen: gravid, vertex fetus per Leopold's, non-tender between contractions.   Cephalic presentation confirmed by BSUS  EFW: 7-8 lbs.   CONTRACTIONS: irreg, 1-2 in ten  FETAL HEART TONES: continuous EFM- baseline 140 with moderate variability and positive accelerations. No decelerations.  PELVIC EXAM: 1.5/ 70%/ Posterior/ average/ 0   AMAYA SCORE: 6  BLOODY SHOW: no   ROM:no  FLUID: none    # Pain Assessment:   Jennifer tolentino pain level was assessed and she currently denies pain.        ASSESSMENT:  ==============  IUP @ 39w6d admitted for induction of labor.  Indication: GDM diet controlled   NST REACTIVE  Fetal Heart Tones - category one  GBS- negative  Covid- pending    Patient Active Problem  "List   Diagnosis    Vitamin D deficiency    Not immune to hepatitis B virus. 4/25 #1    High risk pregnancy, antepartum    Family history of diabetes mellitus- Mom with \"prediabetes\" on meds    Diet controlled gestational diabetes mellitus (GDM) vs pre-gestational, early 1 hour at 14+3 =200    Elevated BP without diagnosis of hypertension       PLAN:  ===========  - Admit - see IP orders  - Blood sugar management orders placed, plan postprandial and fasting BG checks until active labor.  - Discussed IOL process and expectations, cervical ripening options based on exam. Patient open to balloon or misoprostol, prefers vaginal misoprostol. Recommended starting with misoprostol and attempting balloon when cervix is less posterior if still desired, will check in on preferences after 1-2 doses.  - pain medication options of nitrous oxide, fentanyl IV and epidural anesthesia reviewed with pt. Pt is interested in starting with nitrous non-pharmaceutical methods of pain relief, interested in birth ball. Unsure on epidural, planning to use options as needed  - Cervical ripening with vaginal Misoprostol risks and benefits reviewed with pt. Agreeable to plan.  - Ambulation, hydration, position changes, birthing ball and tub options to facilitate labor reviewed with pt .  - Reevaluate as clinically indicated    I, Bree Maya, am serving as a scribe; to document services personally performed by Chandrika Chopra CNM, based on data collection and the provider's statements to me.     MILI Dexter    I agree with the PFSH and ROS as completed by MILI Dexter except for changes made by me. The remainder of the encounter was performed by me and scribed by MILI Dexter. The scribed note accurately reflects my personal services and decisions made by me.   JARED Zhang CNM      "

## 2023-10-20 NOTE — PLAN OF CARE
Jennifer doing well. Is now sanjuana on her own every 1-3 minutes. She is breathing through some contractions. Pitocin ordered but held at this time, due to frequent contractions.  with moderate variability. Accels present, decels are not present. BG checks fasting and one hour postprandial per orders due to GDM- diet controlled. Plan of care ongoing, anticipate .

## 2023-10-20 NOTE — PROGRESS NOTES
Data: Patient admitted to room 479 at 0745. Patient is a . Prenatal record reviewed.   OB History    Para Term  AB Living   1 0 0 0 0 0   SAB IAB Ectopic Multiple Live Births   0 0 0 0 0      # Outcome Date GA Lbr Rayray/2nd Weight Sex Delivery Anes PTL Lv   1 Current            .  Medical History:   Past Medical History:   Diagnosis Date    Gestational diabetes    .  Gestational age 39w6d. Vital signs per doc flowsheet. Fetal movement present. Patient reports Induction Of Labor   as reason for admission. Support persons-  Sathya present.  Action: Verbal consent for EFM, external fetal monitors applied. Admission assessment completed. Patient and support persons educated on labor process. Patient instructed to report change in fetal movement, contractions, vaginal leaking of fluid or bleeding, abdominal pain, or any concerns related to the pregnancy to her nurse/physician. Patient oriented to room, call light in reach.   Response: JAI Chopra saw patient at bedside. Plan per provider is induction of labor with misoprostol vaginally. Patient verbalized understanding of education and verbalized agreement with plan.

## 2023-10-20 NOTE — PROGRESS NOTES
Shift report:    Patient arrived at 0745 for induction of labor. Sterile cervical exam done at 0900 - 1.5 cm dilated, 70% effacement, 0 station. Patient agreed to have induction with vaginal misoprostol. First dose of misoprostol administered at 1015. Second dose of misoprostol held at 1415 due to patient having frequent contractions. 500 ml LR bolus provided and patient resting in her right side. BG 1 hour after lunch was 171 at 1420, recheck again at 1520.    EFM done continuously, normal baseline heart rate, moderate variability, accelerations present and absent decelerations. Mother's VSS. Denies pain between contractions. Denies nausea, vomiting, headache, or SOB.    Patient ambulated in the hallway and in the room. Labor education provided with the use of . Patient using birthing ball and peanut ball.

## 2023-10-20 NOTE — PROGRESS NOTES
"Labor progress note (exam completed at 1530)  Late entry due to cares on unit  S:  Patient now receptive to cervical exam to determine plan.  Would be open to additional cervical ripening with Ko balloon due to too frequent contractions for Pitocin.  Requests IV pain medication prior to attempt at placement.    O:  Blood pressure 104/60, temperature 97.8  F (36.6  C), temperature source Oral, resp. rate 16, last menstrual period 01/14/2023.  General appearance: comfortable.  Contractions: Every 1-4 minutes.  seconds duration.  Palpate: mild and moderate.  FHT: Baseline 150 with moderate variability. Accelerations are present. No decelerations present.  ROM: not ruptured.   Pelvic exam: 1.5/70/0 Conti score 6 at 1530.  Ko supplies gathered and placement began at 1600.  Patient gave verbal consent to place Ko balloon.  IV Fentanyl given at patient request.  Ko catheter advanced into cervix, advanced to uterine level.  Balloon inflated with 50cc saline.  Placement re-checked by CNM.  Balloon noted to be inflated in cervix, when balloon deflated cervix noted to be 4cm/80%/-1 station.  Conti score 9  Pitocin- none,  Antibiotics- none      A:  IUP @ 39w6d induction of labor for GDM A1   Fetal Heart rate tracing category one  GBS- negative  Patient Active Problem List   Diagnosis    Vitamin D deficiency    Not immune to hepatitis B virus. 4/25 #1    High risk pregnancy, antepartum    Family history of diabetes mellitus- Mom with \"prediabetes\" on meds    Diet controlled gestational diabetes mellitus (GDM) vs pre-gestational, early 1 hour at 14+3 =200    Elevated BP without diagnosis of hypertension    Labor and delivery indication for care or intervention         P:  Reviewed frequent contractions and inability to safely start Pitocin at this time   Will re-evaluate in 1-2 hours, if contractions space.  Pitocin induction is recommended.  Patient agrees with plan.  Orders placed to start Pitocin as " needed.  JARED ZhangM

## 2023-10-21 LAB
GLUCOSE BLDC GLUCOMTR-MCNC: 162 MG/DL (ref 70–99)
GLUCOSE BLDC GLUCOMTR-MCNC: 74 MG/DL (ref 70–99)
GLUCOSE BLDC GLUCOMTR-MCNC: 78 MG/DL (ref 70–99)
GLUCOSE BLDC GLUCOMTR-MCNC: 79 MG/DL (ref 70–99)
GLUCOSE BLDC GLUCOMTR-MCNC: 82 MG/DL (ref 70–99)
GLUCOSE BLDC GLUCOMTR-MCNC: 86 MG/DL (ref 70–99)
GLUCOSE BLDC GLUCOMTR-MCNC: 91 MG/DL (ref 70–99)

## 2023-10-21 PROCEDURE — 120N000002 HC R&B MED SURG/OB UMMC

## 2023-10-21 PROCEDURE — 722N000001 HC LABOR CARE VAGINAL DELIVERY SINGLE

## 2023-10-21 PROCEDURE — 0KQM0ZZ REPAIR PERINEUM MUSCLE, OPEN APPROACH: ICD-10-PCS | Performed by: ADVANCED PRACTICE MIDWIFE

## 2023-10-21 PROCEDURE — 250N000011 HC RX IP 250 OP 636: Mod: JZ

## 2023-10-21 PROCEDURE — 250N000009 HC RX 250: Performed by: ADVANCED PRACTICE MIDWIFE

## 2023-10-21 PROCEDURE — 10907ZC DRAINAGE OF AMNIOTIC FLUID, THERAPEUTIC FROM PRODUCTS OF CONCEPTION, VIA NATURAL OR ARTIFICIAL OPENING: ICD-10-PCS | Performed by: ADVANCED PRACTICE MIDWIFE

## 2023-10-21 PROCEDURE — 250N000013 HC RX MED GY IP 250 OP 250 PS 637: Performed by: ADVANCED PRACTICE MIDWIFE

## 2023-10-21 PROCEDURE — 59400 OBSTETRICAL CARE: CPT | Performed by: ADVANCED PRACTICE MIDWIFE

## 2023-10-21 PROCEDURE — 250N000011 HC RX IP 250 OP 636: Mod: JZ | Performed by: ADVANCED PRACTICE MIDWIFE

## 2023-10-21 PROCEDURE — 250N000011 HC RX IP 250 OP 636: Performed by: ADVANCED PRACTICE MIDWIFE

## 2023-10-21 PROCEDURE — 258N000003 HC RX IP 258 OP 636: Performed by: ADVANCED PRACTICE MIDWIFE

## 2023-10-21 RX ORDER — TRANEXAMIC ACID 10 MG/ML
1 INJECTION, SOLUTION INTRAVENOUS EVERY 30 MIN PRN
Status: DISCONTINUED | OUTPATIENT
Start: 2023-10-21 | End: 2023-10-22

## 2023-10-21 RX ORDER — KETOROLAC TROMETHAMINE 30 MG/ML
INJECTION, SOLUTION INTRAMUSCULAR; INTRAVENOUS
Status: COMPLETED
Start: 2023-10-21 | End: 2023-10-21

## 2023-10-21 RX ORDER — METHYLERGONOVINE MALEATE 0.2 MG/ML
200 INJECTION INTRAVENOUS
Status: DISCONTINUED | OUTPATIENT
Start: 2023-10-21 | End: 2023-10-22

## 2023-10-21 RX ORDER — DEXTROSE MONOHYDRATE 25 G/50ML
25-50 INJECTION, SOLUTION INTRAVENOUS
Status: DISCONTINUED | OUTPATIENT
Start: 2023-10-21 | End: 2023-10-22

## 2023-10-21 RX ORDER — ACETAMINOPHEN 325 MG/1
650 TABLET ORAL EVERY 4 HOURS PRN
Status: DISCONTINUED | OUTPATIENT
Start: 2023-10-21 | End: 2023-10-22

## 2023-10-21 RX ORDER — MISOPROSTOL 200 UG/1
800 TABLET ORAL
Status: DISCONTINUED | OUTPATIENT
Start: 2023-10-21 | End: 2023-10-22

## 2023-10-21 RX ORDER — BISACODYL 10 MG
10 SUPPOSITORY, RECTAL RECTAL DAILY PRN
Status: DISCONTINUED | OUTPATIENT
Start: 2023-10-21 | End: 2023-10-22

## 2023-10-21 RX ORDER — CARBOPROST TROMETHAMINE 250 UG/ML
250 INJECTION, SOLUTION INTRAMUSCULAR
Status: DISCONTINUED | OUTPATIENT
Start: 2023-10-21 | End: 2023-10-22

## 2023-10-21 RX ORDER — CEFAZOLIN SODIUM 1 G/3ML
1 INJECTION, POWDER, FOR SOLUTION INTRAMUSCULAR; INTRAVENOUS ONCE
Status: COMPLETED | OUTPATIENT
Start: 2023-10-21 | End: 2023-10-21

## 2023-10-21 RX ORDER — OXYTOCIN 10 [USP'U]/ML
10 INJECTION, SOLUTION INTRAMUSCULAR; INTRAVENOUS
Status: DISCONTINUED | OUTPATIENT
Start: 2023-10-21 | End: 2023-10-22

## 2023-10-21 RX ORDER — ONDANSETRON 2 MG/ML
4 INJECTION INTRAMUSCULAR; INTRAVENOUS EVERY 6 HOURS PRN
Status: DISCONTINUED | OUTPATIENT
Start: 2023-10-21 | End: 2023-10-22

## 2023-10-21 RX ORDER — HYDROCORTISONE 25 MG/G
CREAM TOPICAL 3 TIMES DAILY PRN
Status: DISCONTINUED | OUTPATIENT
Start: 2023-10-21 | End: 2023-10-22

## 2023-10-21 RX ORDER — IBUPROFEN 800 MG/1
800 TABLET, FILM COATED ORAL EVERY 6 HOURS PRN
Status: DISCONTINUED | OUTPATIENT
Start: 2023-10-21 | End: 2023-10-22

## 2023-10-21 RX ORDER — MODIFIED LANOLIN
OINTMENT (GRAM) TOPICAL
Status: DISCONTINUED | OUTPATIENT
Start: 2023-10-21 | End: 2023-10-22

## 2023-10-21 RX ORDER — NICOTINE POLACRILEX 4 MG
15-30 LOZENGE BUCCAL
Status: DISCONTINUED | OUTPATIENT
Start: 2023-10-21 | End: 2023-10-22

## 2023-10-21 RX ORDER — DOCUSATE SODIUM 100 MG/1
100 CAPSULE, LIQUID FILLED ORAL DAILY
Status: DISCONTINUED | OUTPATIENT
Start: 2023-10-21 | End: 2023-10-22

## 2023-10-21 RX ORDER — OXYTOCIN/0.9 % SODIUM CHLORIDE 30/500 ML
340 PLASTIC BAG, INJECTION (ML) INTRAVENOUS CONTINUOUS PRN
Status: DISCONTINUED | OUTPATIENT
Start: 2023-10-21 | End: 2023-10-22

## 2023-10-21 RX ORDER — MISOPROSTOL 200 UG/1
400 TABLET ORAL
Status: DISCONTINUED | OUTPATIENT
Start: 2023-10-21 | End: 2023-10-22

## 2023-10-21 RX ADMIN — ACETAMINOPHEN 650 MG: 325 TABLET, FILM COATED ORAL at 20:01

## 2023-10-21 RX ADMIN — IBUPROFEN 800 MG: 800 TABLET, FILM COATED ORAL at 14:49

## 2023-10-21 RX ADMIN — METHYLERGONOVINE MALEATE 200 MCG: 0.2 INJECTION INTRAVENOUS at 07:45

## 2023-10-21 RX ADMIN — ONDANSETRON 4 MG: 2 INJECTION INTRAMUSCULAR; INTRAVENOUS at 09:46

## 2023-10-21 RX ADMIN — Medication 2 MILLI-UNITS/MIN: at 05:20

## 2023-10-21 RX ADMIN — ONDANSETRON 4 MG: 2 INJECTION INTRAMUSCULAR; INTRAVENOUS at 05:20

## 2023-10-21 RX ADMIN — CEFAZOLIN SODIUM 1 G: 1 INJECTION, POWDER, FOR SOLUTION INTRAMUSCULAR; INTRAVENOUS at 09:25

## 2023-10-21 RX ADMIN — LIDOCAINE HYDROCHLORIDE 20 ML: 10 INJECTION, SOLUTION EPIDURAL; INFILTRATION; INTRACAUDAL; PERINEURAL at 07:36

## 2023-10-21 RX ADMIN — Medication 340 ML/HR: at 07:28

## 2023-10-21 RX ADMIN — KETOROLAC TROMETHAMINE 30 MG: 30 INJECTION, SOLUTION INTRAMUSCULAR; INTRAVENOUS at 08:56

## 2023-10-21 RX ADMIN — SODIUM CHLORIDE, POTASSIUM CHLORIDE, SODIUM LACTATE AND CALCIUM CHLORIDE: 600; 310; 30; 20 INJECTION, SOLUTION INTRAVENOUS at 02:10

## 2023-10-21 RX ADMIN — MISOPROSTOL 800 MCG: 200 TABLET ORAL at 07:34

## 2023-10-21 RX ADMIN — IBUPROFEN 800 MG: 800 TABLET, FILM COATED ORAL at 20:53

## 2023-10-21 RX ADMIN — DOCUSATE SODIUM 100 MG: 100 CAPSULE, LIQUID FILLED ORAL at 09:01

## 2023-10-21 ASSESSMENT — ACTIVITIES OF DAILY LIVING (ADL)
ADLS_ACUITY_SCORE: 18

## 2023-10-21 NOTE — PLAN OF CARE
Goal Outcome Evaluation:      Plan of Care Reviewed With: patient    Overall Patient Progress: improvingOverall Patient Progress: improving         Transfer of care at 1017: Transfer of care handoff report received from PRATIMA Villasenor.    Data: Postpartum vaginal delivery from 10/21/23. Vital signs stable. Uterus at midline. Lochia light rubra, blood clots not present. Jennifer Armando Baez  voiding without difficulty, up ad shania, eating and drinking without nausea. Perineum appears swollen, ice packs and medicated pads in use,  no signs or symptoms of infection.  Breastfeeding infant minimal assistance. Taking ibuprofen for lower back pain and reports adequate pain management.   Action: Education provided on transition of care, room orientation, postpartum activity, perineal care, and plan of care using .   Response: Pt is agreeable with her plan of care. Positive attachment behaviors observed with infant. Support person, Sathya, carlos.    Shift to shift handoff report given to PRATIMA Piedra.       Amarilis Salamanca RN on 10/21/2023 at 3:26 PM

## 2023-10-21 NOTE — PROGRESS NOTES
Data: Jennifer Baez transferred to [facility] room 7121 via wheelchair at 1017. Baby transferred via parent's arms.  Action: Receiving unit notified of transfer: Yes. Patient and family notified of room change. Report given to PRATIMA Jj at 1030. Belongings sent to receiving unit. Accompanied by Registered Nurse. Oriented patient to surroundings. Call light within reach. ID bands double-checked with receiving RN.  Response: Patient tolerated transfer and is stable.

## 2023-10-21 NOTE — PLAN OF CARE
Goal Outcome Evaluation:       Patient delivered vaginally at 0724. Second degree vaginal laceration repaired by CNANIVAL. Baby boy APGAR 9/9. Will closely monitor for 2 hours before transferring to the [facility].

## 2023-10-21 NOTE — PLAN OF CARE
VSS, report denies headache, SOB, RUQ pain or vaginal leaking. Pt reports increased contraction intensity. Nitrous gas was used for comfort and pt later requested epidural. Pt report good pain relief with epidural. Pt blood sugar monitor during active labor. EFM as charted, see flowsheet. Pt position changed with birthing aid during laboring process. Pt complete and pushed with good effort with CNM and Rns at bedside. NICU called to delivery due to cat II tracing during pushing.  of viable Male with RN, CNM and NICU in attendance. Infant with spontaneous cry, to mother's abdomen, dried and stimulated.

## 2023-10-21 NOTE — PROGRESS NOTES
[facility] Labor and Delivery Progress Note    Jennifer Baez MRN# [id number]   Age: 33 year old YOB: 1990           Subjective:   Feeling much more pressure. Overall coping well, epidural providing relief. Sathya at bedside providing support.      via phone.            Objective:   Patient Vitals for the past 24 hrs:   BP Temp Temp src Resp SpO2 Oximeter Heart Rate   10/21/23 0345 106/67 -- -- -- -- 64 bpm   10/21/23 0251 106/64 -- -- -- 100 % 57 bpm   10/21/23 0140 108/74 98  F (36.7  C) Oral 16 99 % 63 bpm   10/21/23 0110 108/71 -- -- -- -- 69 bpm   10/21/23 0040 108/68 -- -- -- 98 % 63 bpm   10/20/23 2345 105/68 -- -- -- 98 % 73 bpm   10/20/23 2340 97/55 -- -- -- -- 68 bpm   10/20/23 2300 108/67 -- -- -- -- 64 bpm   10/20/23 2255 107/66 -- -- -- -- 62 bpm   10/20/23 2250 108/67 -- -- -- -- 60 bpm   10/20/23 2240 105/65 -- -- -- -- 55 bpm   10/20/23 2235 107/68 97.3  F (36.3  C) Oral 16 -- 61 bpm   10/20/23 2230 -- -- -- -- -- 66 bpm   10/20/23 2225 106/66 -- -- -- -- 64 bpm   10/20/23 2220 104/63 -- -- -- -- 69 bpm   10/20/23 2215 104/62 -- -- -- 99 % 64 bpm   10/20/23 2212 103/62 -- -- -- -- 64 bpm   10/20/23 2210 102/64 -- -- -- -- 69 bpm   10/20/23 2208 101/66 -- -- -- -- 67 bpm   10/20/23 2206 103/69 -- -- -- -- 65 bpm   10/20/23 2137 111/70 98.1  F (36.7  C) Oral 16 -- 57 bpm   10/20/23 1943 102/63 98.4  F (36.9  C) Oral 16 -- 68 bpm   10/20/23 1550 106/64 -- -- 18 -- 68 bpm   10/20/23 1221 104/60 97.8  F (36.6  C) Oral 16 -- 71 bpm   10/20/23 0810 104/59 98  F (36.7  C) Oral 16 -- 69 bpm         Cervical Exam: 10 / 100% / 0      Position: Anterior  Risks, benefits of AROM discussed. AROM performed with consent. Clear fluid.     Membranes: Ruptured      Fetal Heart Rate:    Monitor: external US    Variability: moderate. Accelerations present. No decelerations.    Baseline Rate: 155   [place]: contractions every 2-5 minutes, lasting  seconds. Moderate to  "palpation.         Assessment:   Jennifer Baze is a 33 year old  who is 39w6d here for IOL  Indication: GDMA1   Fetal Heart Rate Tracing: category 1   GBS negative     Patient Active Problem List   Diagnosis    Vitamin D deficiency    Not immune to hepatitis B virus.  #1    High risk pregnancy, antepartum    Family history of diabetes mellitus- Mom with \"prediabetes\" on meds    Diet controlled gestational diabetes mellitus (GDM) vs pre-gestational, early 1 hour at 14+3 =200    Elevated BP without diagnosis of hypertension    Labor and delivery indication for care or intervention           Plan:   Initiate pushing, facilitate position changes and pushing efforts   Continue to monitor.   Anticipate .      JARED Amin CNM      "

## 2023-10-21 NOTE — ANESTHESIA PROCEDURE NOTES
"Epidural catheter Procedure Note    Pre-Procedure   Staff -        Anesthesiologist:  Nasima Damian MD       Resident/Fellow: Gilbert Triplett MD       Performed By: resident       Location: OB       Pre-Anesthestic Checklist: patient identified, IV checked, risks and benefits discussed, informed consent, monitors and equipment checked, pre-op evaluation, at physician/surgeon's request and post-op pain management  Timeout:       Correct Patient: Yes        Correct Procedure: Yes        Correct Site: Yes        Correct Position: Yes   Procedure Documentation  Procedure: epidural catheter       Patient Position: sitting       Patient Prep/Sterile Barriers: sterile gloves, mask, patient draped       Skin prep: Chloraprep       Local skin infiltrated with 3 mL of 1% lidocaine.        Insertion Site: L3-4. (midline approach).       Technique: LORT saline        KIKI at 5 cm.       Needle Type: [employer] needle       Needle Gauge: 17.        Needle Length (Inches): 3.5        Catheter: 19 G.          Catheter threaded easily.         5 cm epidural space.         Threaded 10 cm at skin.         # of attempts: 1 and  # of redirects:  0    Assessment/Narrative         Paresthesias: Resolved.       Test dose of 3 mL lidocaine 1.5% w/ 1:200,000 epinephrine at 21:57 CDT.         Test dose negative, 3 minutes after injection, for signs of intravascular, subdural, or intrathecal injection.       Insertion/Infusion Method: LORT saline       Aspiration negative for Heme or CSF via Epidural Catheter.      FOR [facility] ([facility]) ONLY:   Pain Team Contact information: please page the Pain Team Via [employer]. Search \"Pain\". During daytime hours, please page the attending first. At night please page the resident first.      "

## 2023-10-21 NOTE — PROGRESS NOTES
"[facility] Labor and Delivery Progress Note    Jennifer Baez MRN# [id number]   Age: 33 year old YOB: 1990           Subjective:   Laboring after 1 dose of misoprostol. Becoming much more uncomfortable, standing up at side of bed. Interested in nitrous at this time, may desire epidural later. Ok with cervical exam.  in room offering labor support.      present for discussion.            Objective:   Patient Vitals for the past 24 hrs:   BP Temp Temp src Resp Oximeter Heart Rate   10/20/23 1943 102/63 98.4  F (36.9  C) Oral 16 68 bpm   10/20/23 1550 106/64 -- -- 18 68 bpm   10/20/23 1221 104/60 97.8  F (36.6  C) Oral 16 71 bpm   10/20/23 0810 104/59 98  F (36.7  C) Oral 16 69 bpm       Most recent blood sugar within normal limits.     Cervical Exam: 5 / 80% / 0      Position: mid and soft     Membranes: Intact     Fetal Heart Rate:    Monitor: external US    Variability: moderate. Accelerations present. No decelerations.    Baseline Rate: 140  [place]: contractions every 2-4 minutes, lasting 50-90 seconds. Strong to palpation. Soft resting tone.         Assessment:   Jennifer Baez is a 33 year old  who is 39w6d here for IOL  Indication: GDMA1   Fetal Heart Rate Tracing: category 1   GBS negative     Patient Active Problem List   Diagnosis    Vitamin D deficiency    Not immune to hepatitis B virus.  #1    High risk pregnancy, antepartum    Family history of diabetes mellitus- Mom with \"prediabetes\" on meds    Diet controlled gestational diabetes mellitus (GDM) vs pre-gestational, early 1 hour at 14+3 =200    Elevated BP without diagnosis of hypertension    Labor and delivery indication for care or intervention           Plan:   Pain medication: nitrous oxide at this time. May desire epidural later, anesthesia notified for discussion while  available.   Continue to monitor labor progress, FHR status.   Comfort measures prn.   MD consultant on " call / available prn  Anticipate       Gema Carter, JARED CNM

## 2023-10-21 NOTE — PROGRESS NOTES
[facility] Labor and Delivery Progress Note    Jennifer Baez MRN# [id number]   Age: 33 year old YOB: 1990           Subjective:   Received epidural at 2200. Very comfortable, resting on left side. , Sathya, in room for support.            Objective:   Patient Vitals for the past 24 hrs:   BP Temp Temp src Resp SpO2 Oximeter Heart Rate   10/20/23 2345 105/68 -- -- -- 98 % 73 bpm   10/20/23 2340 97/55 -- -- -- -- 68 bpm   10/20/23 2300 108/67 -- -- -- -- 64 bpm   10/20/23 2255 107/66 -- -- -- -- 62 bpm   10/20/23 2250 108/67 -- -- -- -- 60 bpm   10/20/23 2240 105/65 -- -- -- -- 55 bpm   10/20/23 2235 107/68 97.3  F (36.3  C) Oral 16 -- 61 bpm   10/20/23 2230 -- -- -- -- -- 66 bpm   10/20/23 2225 106/66 -- -- -- -- 64 bpm   10/20/23 2220 104/63 -- -- -- -- 69 bpm   10/20/23 2215 104/62 -- -- -- 99 % 64 bpm   10/20/23 2212 103/62 -- -- -- -- 64 bpm   10/20/23 2210 102/64 -- -- -- -- 69 bpm   10/20/23 2208 101/66 -- -- -- -- 67 bpm   10/20/23 2206 103/69 -- -- -- -- 65 bpm   10/20/23 2137 111/70 98.1  F (36.7  C) Oral 16 -- 57 bpm   10/20/23 1943 102/63 98.4  F (36.9  C) Oral 16 -- 68 bpm   10/20/23 1550 106/64 -- -- 18 -- 68 bpm   10/20/23 1221 104/60 97.8  F (36.6  C) Oral 16 -- 71 bpm   10/20/23 0810 104/59 98  F (36.7  C) Oral 16 -- 69 bpm       Blood sugars within normal range.     Cervical Exam: 6 / 90% / 0      Position: Mid    Membranes: Intact     Fetal Heart Rate:    Monitor: external US    Variability: moderate. Accelerations present. No decelerations.    Baseline Rate: 145  [place]: contractions every 4-5 minutes, lasting          Assessment:   Jennifer Baez is a 33 year old  who is 39w6d here for IOL  Indication: GDMA1   Fetal Heart Rate Tracing: category 1   GBS negative     Patient Active Problem List   Diagnosis    Vitamin D deficiency    Not immune to hepatitis B virus.  #1    High risk pregnancy, antepartum    Family history of diabetes mellitus-  "Mom with \"prediabetes\" on meds    Diet controlled gestational diabetes mellitus (GDM) vs pre-gestational, early 1 hour at 14+3 =200    Elevated BP without diagnosis of hypertension    Labor and delivery indication for care or intervention           Plan:   Continue to monitor labor progress, consider pitocin if contractions space.  In active labor, check blood sugars per protocol.   Comfort measures, facilitate position changes prn   Anticipate .     JARED Amin CNM      "

## 2023-10-21 NOTE — PLAN OF CARE
[id number]  Goal Outcome Evaluation:  Vital signs within normal limits. Postpartum checks within normal limits. Patient eating and drinking normally. Patient able to empty bladder independently and is up ambulating. Adequate pain control.  Patient performing self cares and is able to care for infant. Breastfeeding on cue, requires assist with deeper latch and positioning.Positive attachment behaviors observed with infant. Support person present and very supportive. Will continue to assess/assist as needed.

## 2023-10-21 NOTE — L&D DELIVERY NOTE
DELIVERY NOTE:  Jennifer Baez is a 33 year old  at 40w0d who [facility] for GDMA1. Patient received vaginal misoprostol for cervical ripening, labor progressed on own after one dose. Received epidural for adequate pain relief at 2200. Progressed to complete and 0 station at 0400. AROM was offered and accepted.  Began pushing efforts at 0430. Pitocin was started at 0520 for augmentation. Pushed effectively with CNM and RN coaching. FHR with decels with pushing effort, resolution to baseline. Head delivered OA and restituted to TUCKER . No nuchal cord. Shoulders easily delivered under maternal effort.  Live male  delivered at 0724 under epidural anesthesia.  Spontaneous breath, vigorous cry, well flexed, HR>100. Infant directly to maternal abdomen, skin to skin. Delayed cord clamping for >1 minutes then clamped x2 and cut by FOB. 20 units of pitocin infusing after baby with pt's consent. Cord segment for gases. Intact placenta spontaneously delivered via [place] at 0728.  3 vessel cord.  Excessive bleeding noted with fundal massage.  800mcg of rectal misoprostol given and sweep of lower uterine segment performed for removal of clots. Fundus firm with massage and bleeding decreased. Vagina, perineum, and rectum inspected, 2nd degree laceration repaired with 3-0 vicryl suture in the usual fashion. Hemostasis noted. Continued trickle noted with fundal massage and IM methergine given. Mother and infant stable; continued skin to skin. Good family bonding observed.     Delivery Note:   IUP at 40 weeks gestation delivered on 2023.     delivery of a viable Male infant.  Weight : 7 lbs 1.9 oz, 3230g  Apgars of 9 at 1 minute and 9 at 5 minutes.  Labor was induced.  Medications administered  in labor:  Pain Rx Epidural; Antibiotics No; Other n/a  Perineum: 2nd degree  Placenta-mechanism: spontaneous, intact,  with a 3 vessel cord. IV oxytocin was given After delivery of baby  Rectal misoprostol and IM methergine given.  Quantitative Blood Loss was 543cc.   Complications of labor and delivery: Category two FHT tracing, Gestational Diabetes, and Prolonged 2nd Stage (>2 hr pr)  Anticipated Discharge Date: 10/22/2023  Birth attendants: JAI Arboleda APRN CNANIVAL    Delivery Summary    Jennifer Baez MRN# [id number]   Age: 33 year old YOB: 1990     ASSESSMENT & PLAN:   , 2nd deg repair       Armando Baez, Male-Jennifer [[id number]]      Labor Event Times          Active labor onset date: 10/21/23 Onset time: 12:04 AM   Dilation complete date: 10/21/23 Complete time:  3:58 AM   Start pushing date/time: 10/21/2023 0432          Labor Events     labor?: No   steroids: None  Labor Type: Induction/Cervical ripening  Predominate monitoring during 1st stage: continuous electronic fetal monitoring     Antibiotics received during labor?: No     Rupture identifier: Sac 1  Rupture date/time: 10/21/23 0424   Rupture type: Artificial Rupture of Membranes  Fluid color: Clear  Fluid odor: Normal       Induction date/time:      Cervical ripening date/time: 10/20/23 1015           Delivery/Placenta Date and Time      Delivery Date: 10/21/23 Delivery Time:  7:24 AM                 Vaginal Counts       Initial count performed by 2 team members:  Two Team Members   podoll, a   jyothi, a         Needles Suture Needles Sponges (RETIRED) Instruments   Initial counts 2  5    Added to count       Relief counts       Final counts               Placed during labor Accounted for at the end of labor   FSE     IUPC     Cervidil                                     Delivery (Maternal) (Provider to Complete) ([id number])                 Blood Loss  Mother: Jennifer Parks #[id number]     Start of Mother's Information      Delivery Blood Loss  10/21/23 0004 - 10/21/23 0811      Delivery QBL (mL) Hospital Encounter 543 mL    Total  543 mL               End of  Mother's Information  Mother: Armando SkeltonzJennifer #[id number]                Anesthesia    Method: Epidural                           JARED Schmitt CNM

## 2023-10-21 NOTE — ANESTHESIA PREPROCEDURE EVALUATION
Anesthesia Pre-Procedure Evaluation    Patient: Jennifer Baez   MRN: [id number] : 1990        Procedure :           Past Medical History:   Diagnosis Date     Gestational diabetes       Past Surgical History:   Procedure Laterality Date     ACHILLES TENDON SURGERY Left     no problems      No Known Allergies   Social History     Tobacco Use     Smoking status: Never     Smokeless tobacco: Never   Substance Use Topics     Alcohol use: Not Currently      Wt Readings from Last 1 Encounters:   10/17/23 58.9 kg (129 lb 12.8 oz)        Anesthesia Evaluation   Pt has had prior anesthetic.         ROS/MED HX  ENT/Pulmonary:  - neg pulmonary ROS  (-) tobacco use   Neurologic:  - neg neurologic ROS     Cardiovascular: Comment: Elevated BP without diagnosis of hypertension      METS/Exercise Tolerance:     Hematologic:  - neg hematologic  ROS     Musculoskeletal: Comment: HX/O ACHILLES TENDON SURGERY      GI/Hepatic:  - neg GI/hepatic ROS     Renal/Genitourinary:       Endo: Comment: Diet controlled gestational diabetes mellitus (GDM) vs pre-gestational    (+) gestational diabetes and Diet- controlled,    Psychiatric/Substance Use:  - neg psychiatric ROS     Infectious Disease: Comment: Not immune to hepatitis B virus      Malignancy:       Other:            Physical Exam    Airway        Mallampati: II   TM distance: > 3 FB   Neck ROM: full   Mouth opening: > 3 cm    Respiratory Devices and Support         Dental  no notable dental history         Cardiovascular   cardiovascular exam normal          Pulmonary   pulmonary exam normal                OUTSIDE LABS:  CBC:   Lab Results   Component Value Date    WBC 7.7 10/20/2023    WBC 8.6 2023    HGB 13.1 10/20/2023    HGB 12.5 2023    HCT 38.0 10/20/2023    HCT 36.1 2023     10/20/2023     2023     BMP:   Lab Results   Component Value Date    CR 0.50 (L) 2023     (H) 10/20/2023     (H) 10/20/2023  "    COAGS: No results found for: \"PTT\", \"INR\", \"FIBR\"  POC: No results found for: \"BGM\", \"HCG\", \"HCGS\"  HEPATIC:   Lab Results   Component Value Date    ALT 11 07/05/2023    AST 19 07/05/2023     OTHER:   Lab Results   Component Value Date    A1C 4.6 04/11/2023       Anesthesia Plan    ASA Status:  3      Anesthesia Type: Epidural.              Consents    Anesthesia Plan(s) and associated risks, benefits, and realistic alternatives discussed. Questions answered and patient/representative(s) expressed understanding.    - Discussed:     - Discussed with:  Patient         Postoperative Care            Comments:           neg OB ROS.       Gilbert Triplett MD  "

## 2023-10-22 LAB
GLUCOSE BLDC GLUCOMTR-MCNC: 75 MG/DL (ref 70–99)
HGB BLD-MCNC: 10.6 G/DL (ref 11.7–15.7)

## 2023-10-22 PROCEDURE — 85018 HEMOGLOBIN: CPT | Performed by: ADVANCED PRACTICE MIDWIFE

## 2023-10-22 PROCEDURE — 250N000013 HC RX MED GY IP 250 OP 250 PS 637: Performed by: ADVANCED PRACTICE MIDWIFE

## 2023-10-22 PROCEDURE — 36415 COLL VENOUS BLD VENIPUNCTURE: CPT | Performed by: ADVANCED PRACTICE MIDWIFE

## 2023-10-22 PROCEDURE — 120N000002 HC R&B MED SURG/OB UMMC

## 2023-10-22 RX ORDER — OXYTOCIN 10 [USP'U]/ML
10 INJECTION, SOLUTION INTRAMUSCULAR; INTRAVENOUS
Status: DISCONTINUED | OUTPATIENT
Start: 2023-10-22 | End: 2023-10-23 | Stop reason: HOSPADM

## 2023-10-22 RX ORDER — IBUPROFEN 800 MG/1
800 TABLET, FILM COATED ORAL EVERY 6 HOURS PRN
Status: DISCONTINUED | OUTPATIENT
Start: 2023-10-22 | End: 2023-10-23 | Stop reason: HOSPADM

## 2023-10-22 RX ORDER — CARBOPROST TROMETHAMINE 250 UG/ML
250 INJECTION, SOLUTION INTRAMUSCULAR
Status: DISCONTINUED | OUTPATIENT
Start: 2023-10-22 | End: 2023-10-23 | Stop reason: HOSPADM

## 2023-10-22 RX ORDER — ACETAMINOPHEN 325 MG/1
650 TABLET ORAL EVERY 4 HOURS PRN
Status: DISCONTINUED | OUTPATIENT
Start: 2023-10-22 | End: 2023-10-23 | Stop reason: HOSPADM

## 2023-10-22 RX ORDER — MISOPROSTOL 200 UG/1
400 TABLET ORAL
Status: DISCONTINUED | OUTPATIENT
Start: 2023-10-22 | End: 2023-10-23 | Stop reason: HOSPADM

## 2023-10-22 RX ORDER — TRANEXAMIC ACID 10 MG/ML
1 INJECTION, SOLUTION INTRAVENOUS EVERY 30 MIN PRN
Status: DISCONTINUED | OUTPATIENT
Start: 2023-10-22 | End: 2023-10-23 | Stop reason: HOSPADM

## 2023-10-22 RX ORDER — MISOPROSTOL 200 UG/1
800 TABLET ORAL
Status: DISCONTINUED | OUTPATIENT
Start: 2023-10-22 | End: 2023-10-23 | Stop reason: HOSPADM

## 2023-10-22 RX ORDER — DOCUSATE SODIUM 100 MG/1
100 CAPSULE, LIQUID FILLED ORAL DAILY
Status: DISCONTINUED | OUTPATIENT
Start: 2023-10-22 | End: 2023-10-23 | Stop reason: HOSPADM

## 2023-10-22 RX ORDER — HYDROCORTISONE 25 MG/G
CREAM TOPICAL 3 TIMES DAILY PRN
Status: DISCONTINUED | OUTPATIENT
Start: 2023-10-22 | End: 2023-10-23 | Stop reason: HOSPADM

## 2023-10-22 RX ORDER — OXYTOCIN/0.9 % SODIUM CHLORIDE 30/500 ML
340 PLASTIC BAG, INJECTION (ML) INTRAVENOUS CONTINUOUS PRN
Status: DISCONTINUED | OUTPATIENT
Start: 2023-10-22 | End: 2023-10-23 | Stop reason: HOSPADM

## 2023-10-22 RX ORDER — MODIFIED LANOLIN
OINTMENT (GRAM) TOPICAL
Status: DISCONTINUED | OUTPATIENT
Start: 2023-10-22 | End: 2023-10-23 | Stop reason: HOSPADM

## 2023-10-22 RX ORDER — METHYLERGONOVINE MALEATE 0.2 MG/ML
200 INJECTION INTRAVENOUS
Status: DISCONTINUED | OUTPATIENT
Start: 2023-10-22 | End: 2023-10-23 | Stop reason: HOSPADM

## 2023-10-22 RX ORDER — BISACODYL 10 MG
10 SUPPOSITORY, RECTAL RECTAL DAILY PRN
Status: DISCONTINUED | OUTPATIENT
Start: 2023-10-22 | End: 2023-10-23 | Stop reason: HOSPADM

## 2023-10-22 RX ADMIN — ACETAMINOPHEN 650 MG: 325 TABLET, FILM COATED ORAL at 08:57

## 2023-10-22 RX ADMIN — ACETAMINOPHEN 650 MG: 325 TABLET, FILM COATED ORAL at 01:34

## 2023-10-22 RX ADMIN — DOCUSATE SODIUM 100 MG: 100 CAPSULE, LIQUID FILLED ORAL at 08:57

## 2023-10-22 RX ADMIN — IBUPROFEN 800 MG: 800 TABLET, FILM COATED ORAL at 08:57

## 2023-10-22 ASSESSMENT — ACTIVITIES OF DAILY LIVING (ADL)
ADLS_ACUITY_SCORE: 18

## 2023-10-22 NOTE — PLAN OF CARE
Goal Outcome Evaluation: VSS. Patient ambulating free of dizziness. Voiding without difficulty. Pain managed on tylenol and ibuprofen. Working on breastfeeding every 2-3 hours. Attentive to and bonding well with infant. Plan of care ongoing.       Plan of Care Reviewed With: patient    Overall Patient Progress: improvingOverall Patient Progress: improving

## 2023-10-22 NOTE — LACTATION NOTE
"This note was copied from a baby's chart.  Consult for:  First time breastfeeding, questions about formula supplementation, home pumps and milk storage.     Infant's Primary Care Clinic: Undecided, [facility]    Delivery Information:  Infant was born at 40w0d via vaginal delivery on 10/21/2023 7:24 AM     Maternal Health History:    Information for the patient's mother:  Jennifer Parks [[id number]]     Patient Active Problem List   Diagnosis    Vitamin D deficiency    Not immune to hepatitis B virus.  #1    High risk pregnancy, antepartum    Family history of diabetes mellitus- Mom with \"prediabetes\" on meds    Diet controlled gestational diabetes mellitus (GDM) vs pre-gestational, early 1 hour at 14+3 =200    Elevated BP without diagnosis of hypertension    Labor and delivery indication for care or intervention      Maternal Breast Exam/Breastfeeding History:  Jennifer noted breast growth and sensitivity in early pregnancy. She denies any history of breast/chest injury or surgery. She has attempted hand expression but shares she was not able to express milk.    Infant information: Infant was AGA at birth and has age appropriate. He has not yet been weighed.     Feeding History: Jennifer shares she feels breastfeeding is going well. She had support from her RN at the last feeding. She shares she just fed, declined a feeding attempt and hand expression demo as she wants the baby's tests to be completed. Encouraged family to call for feeding support with next feeding.     Education:   - Expected  feeding patterns in the first few days   - Stages of milk production  - Benefits of hand expression of colostrum    - Benefits of feeding on cue  - How to tell if baby is getting enough  - Expected  output  - Roby weight loss  - Infant Feeding Log  - [facility] Breastfeeding/Pumping videos  - breast pumps for home use  - Inpatient breastfeeding support  - " Outpatient lactation resources    Handouts: Infant Feeding Log (Week 1, Your Guide to Postpartum &  Care Book) and [facility] Lactation Resources    Plan: Continue breastfeeding on cue with RN support as needed with a goal of 8-12 feedings per day.     Encourage frequent skin to skin, breast massage and hand expression.     Reviewed home pumps we have available. Family undecided at this time but will let us know what type of pump they would like prior to discharge.     Encouraged follow up with outpatient lactation consultant  as needed after discharge.      Christina Barajas RN, IBCLC   Lactation Consultant  Ascom: *[phone]  Office: [phone]

## 2023-10-22 NOTE — PLAN OF CARE
Data: Vital signs within normal limits. Unable to do a fundal check do to patient breastfeeding - see flow record. Patient able to empty bladder independently and is up ambulating. No apparent signs of infection. Patient performing self cares and is able to care for infant.    Action: Patient medicated during the shift for pain. See MAR. Patient reassessed after each medication and pain was improved - patient stated she was comfortable. Patient education done about medications that are safe to take while breastfeeding. See flow record.    Response: Positive attachment behaviors observed with infant.  Sathya present.     Plan: Continue to follow the care plan.

## 2023-10-22 NOTE — PROGRESS NOTES
"Postpartum Day #1 Note:  SIGNIFICANT PROBLEMS:  Patient Active Problem List    Diagnosis Date Noted    Labor and delivery indication for care or intervention 10/20/2023     Priority: Medium    Elevated BP without diagnosis of hypertension 2023     Priority: Medium     : elevated bp x 1- 1st, repeat wnl, pre-e labs___  Will meet GHTN dx if another elevate in pregnancy      Diet controlled gestational diabetes mellitus (GDM) vs pre-gestational, early 1 hour at 14+3 =200 2023     Priority: Medium     - failed early GCT with 200 at 14w3d  Hbg A1c = 4.8  , 5/10 - diabetic ed, 100% of values WNL  23- fastings wnl, daily elevations after breakfast and some lunch. Follow-up with Dr Phan bojorquez insulin__  23 [facility] US Continue  surveillance with fetal growth scans every 4 weeks beginning at 28 weeks.   Recommendation for delivery: GDM A1: [id number]   BS QID WNL    BS   10/3: BS all nml         High risk pregnancy, antepartum 04/15/2023     Priority: Medium     [facility] Patient Provider Group choice: CNM group  Partner's name: Sathya  [x]NOB folder  [x]Dating by LMP  [x] 1st trimester screening: [facility] referral placed for 1st trimester screening place  [x]Offer AFP after 15 wks  [x]Fetal anatomy US ordered  [x]Rubella immune  [x]Hep B nonimmune  []Pap  [x] No added risk for PRE-E  [x] GDM risk, recommended early GCT and hgb A1C  [x]No need for utox in labor  [x]COVID vaccine completed-declined  _____________________________________  [x]EOB folder  [x]PP Contraception plan: unsure  [x]Labor plans: open  [x]: NO,   [x]Infant feeding plan breast  [x]FLU shot  [x]TDAP given  [x]GCT, -->GDM dx  ________________________________________  [x] OTC PP meds sent  []PP plans, time off, support system discussed, resources offered          Family history of diabetes mellitus- Mom with \"prediabetes\" on meds 04/15/2023     Priority: Medium     Mom with \"prediabetes\" on " meds    - hbga1c, discuss early 1 hour at next visit    4/25/2023: early 1 hour glucose ____      Vitamin D deficiency 04/12/2023     Priority: Medium     4/25/2023: started supplementation   8/6/23- Vit d 40- decrease supplement      Not immune to hepatitis B virus. 4/25 #1 04/12/2023     Priority: Medium     4/25/2023: Hep B #1  6/13/23: #2       INTERVAL HISTORY:  BP 91/57 (BP Location: Left arm, Patient Position: Semi-Orellana's, Cuff Size: Adult Small)   Pulse 69   Temp 97.7  F (36.5  C) (Oral)   Resp 16   LMP 01/14/2023   SpO2 100%   Pt stable, baby is rooming in. Baby did not past initial hearing screening, plan to retest tomorrow.    Breast feeding status: initiated and improving with assistance  Complications since 2 hours post delivery: None  Patient is tolerating activity well. Voiding without difficulty, no BM yet.  Cramping is minimal and is relieved by ibuprofen, lochia is decreasing and patient denies clots.  Perineal pain is minimal.  The perineum laceration is well approximated.    Physical Exam:  Breasts: soft, nontender, nipples intact  Abdomen: soft, nontender, fundus at 2 below U  Lochia: small amount, rubra, no clots or odor  Perineum: well-approximated, healing well  Extremities: no edema    Postpartum hemoglobin   Hemoglobin   Date Value Ref Range Status   10/20/2023 13.1 11.7 - 15.7 g/dL Final   Postpartum hemoglobin not drawn.     Rh pos  Rubella status : immune  History of depression: denies    ASSESSMENT/PLAN:  Normal postpartum exam, stable postpartum day #1  Complications:breastfeeding difficulties  Plan d/c home tomorrow. Home Visit Ordered- Yes: new breastfeeding mother  RTC 2 weeks  Postpartum warning s/s reviewed, including bleeding/clots, fever, mastitis, or depression  Continue prenatal vitamins  Birthcontrol planned: Condoms. Reviewed options for progestin-only methods.    Current Discharge Medication List        CONTINUE these medications which have NOT CHANGED    Details    acetaminophen (TYLENOL) 325 MG tablet Take 2 tablets (650 mg) by mouth every 6 hours as needed for mild pain Start after Delivery.  Qty: 100 tablet, Refills: 0    Associated Diagnoses: High risk pregnancy, antepartum      acetone urine (KETOSTIX) test strip Test with first morning void and once negative or trace x 7 day in a row reduce to once weekly testing  Qty: 50 strip, Refills: 1    Associated Diagnoses: Gestational diabetes      blood glucose (NO BRAND SPECIFIED) lancets standard Use to test blood sugar 4 times daily or as directed.  Qty: 124 lancet., Refills: 4    Associated Diagnoses: Gestational diabetes      blood glucose (NO BRAND SPECIFIED) test strip Use to test blood sugar 4 times daily or as directed.  Qty: 124 strip, Refills: 4    Associated Diagnoses: Gestational diabetes      blood glucose monitoring (NO BRAND SPECIFIED) meter device kit Use to test blood sugar 4 times daily or as directed.  Qty: 1 kit, Refills: 0    Associated Diagnoses: Gestational diabetes      blood glucose monitoring (SOFTCLIX) lancets test four times daily      doxylamine (UNISOM) 25 MG TABS tablet Take 25 mg by mouth At Bedtime      ibuprofen (ADVIL/MOTRIN) 600 MG tablet Take 1 tablet (600 mg) by mouth every 6 hours as needed for moderate pain Start after delivery  Qty: 60 tablet, Refills: 0    Associated Diagnoses: High risk pregnancy, antepartum      Prenatal Vit-Fe Fumarate-FA (PRENATAL MULTIVITAMIN W/IRON) 27-0.8 MG tablet Take 1 tablet by mouth daily      senna-docusate (SENOKOT-S/PERICOLACE) 8.6-50 MG tablet Take 1 tablet by mouth daily Start after delivery.  Qty: 100 tablet, Refills: 0    Associated Diagnoses: High risk pregnancy, antepartum           JARED Schmitt CN

## 2023-10-22 NOTE — PLAN OF CARE
Goal Outcome Evaluation:       VSS and postpartum assessment WDL. Pain adequately managed with tylenol and ibuprofen. Voiding without difficulty. Breastfeeding independently with good latch. Bonding well with . Continue with plan of care.

## 2023-10-22 NOTE — PLAN OF CARE
Data: Vital signs within normal limits. Postpartum checks within normal limits - see flow record. Patient eating and drinking normally. Patient able to empty bladder independently and is up ambulating. No apparent signs of infection.  perineum  healing well. Patient performing self cares and is able to care for infant.  Action: Patient medicated during the shift for pain and cramping. See MAR. Patient reassessed within 1 hour after each medication and pain was improved - patient stated she was comfortable. Patient education done about pain control, pumping, hand expression. See flow record.  Response: Positive attachment behaviors observed with infant. Support persons are present.   Plan: Anticipate discharge on Monday.Goal Outcome Evaluation:      Plan of Care Reviewed With: patient    Overall Patient Progress: improvingOverall Patient Progress: improving

## 2023-10-23 ENCOUNTER — APPOINTMENT (OUTPATIENT)
Dept: INTERPRETER SERVICES | Facility: CLINIC | Age: 33
End: 2023-10-23
Payer: COMMERCIAL

## 2023-10-23 VITALS
HEART RATE: 62 BPM | DIASTOLIC BLOOD PRESSURE: 70 MMHG | BODY MASS INDEX: 24.3 KG/M2 | WEIGHT: 120.3 LBS | TEMPERATURE: 97.8 F | RESPIRATION RATE: 16 BRPM | SYSTOLIC BLOOD PRESSURE: 100 MMHG | OXYGEN SATURATION: 100 %

## 2023-10-23 PROCEDURE — 250N000013 HC RX MED GY IP 250 OP 250 PS 637: Performed by: ADVANCED PRACTICE MIDWIFE

## 2023-10-23 RX ADMIN — DOCUSATE SODIUM 100 MG: 100 CAPSULE, LIQUID FILLED ORAL at 07:56

## 2023-10-23 ASSESSMENT — ACTIVITIES OF DAILY LIVING (ADL)
ADLS_ACUITY_SCORE: 18

## 2023-10-23 NOTE — DISCHARGE SUMMARY
"Post Partum Note and Discharge Summary  SIGNIFICANT PROBLEMS:  Patient Active Problem List    Diagnosis Date Noted    Diet controlled gestational diabetes mellitus (GDM) vs pre-gestational, early 1 hour at 14+3 =200 2023     Priority: High     - failed early GCT with 200 at 14w3d  Hbg A1c = 4.8  5/2, 5/10 - diabetic ed, 100% of values WNL  23- fastings wnl, daily elevations after breakfast and some lunch. Follow-up with Dr Phan bojorquez insulin__  23 [facility] US Continue  surveillance with fetal growth scans every 4 weeks beginning at 28 weeks.   Recommendation for delivery: GDM A1: [id number]   BS QID WNL    BS   10/3: BS all nml         High risk pregnancy, antepartum 04/15/2023     Priority: High     [facility] Patient Provider Group choice: CNM group  Partner's name: Sathya  [x]NOB folder  [x]Dating by LMP  [x] 1st trimester screening: [facility] referral placed for 1st trimester screening place  [x]Offer AFP after 15 wks  [x]Fetal anatomy US ordered  [x]Rubella immune  [x]Hep B nonimmune  []Pap  [x] No added risk for PRE-E  [x] GDM risk, recommended early GCT and hgb A1C  [x]No need for utox in labor  [x]COVID vaccine completed-declined  _____________________________________  [x]EOB folder  [x]PP Contraception plan: unsure  [x]Labor plans: open  [x]: NO,   [x]Infant feeding plan breast  [x]FLU shot  [x]TDAP given  [x]GCT, -->GDM dx  ________________________________________  [x] OTC PP meds sent  []PP plans, time off, support system discussed, resources offered          Labor and delivery indication for care or intervention 10/20/2023     Priority: Medium    Elevated BP without diagnosis of hypertension 2023     Priority: Medium     : elevated bp x 1- 1st, repeat wnl, pre-e labs___  Will meet GHTN dx if another elevate in pregnancy      Family history of diabetes mellitus- Mom with \"prediabetes\" on meds 04/15/2023     Priority: Medium     Mom with " "\"prediabetes\" on meds    - hbga1c, discuss early 1 hour at next visit    2023: early 1 hour glucose ____      Vitamin D deficiency 2023     Priority: Medium     2023: started supplementation   23- Vit d 40- decrease supplement      Not immune to hepatitis B virus.  #1 2023     Priority: Medium     2023: Hep B #1  23: #2       In person   ADMISSION DIAGNOSIS:  Labor and Delivery   DISCHARGE DIAGNOSIS:     HOSPITAL COURSE:  40w0d  Jennifer Baez is a 33 year old female     [facility]2023  7:55 AM  in for induction of labor.  Indication GDMA1.   DELIVERY NOTE:  Jennifer Baez is a 33 year old  at 40w0d who presented to [facility] for IOL for GDMA1. Patient received vaginal misoprostol for cervical ripening, labor progressed on own after one dose. Received epidural for adequate pain relief at 2200. Progressed to complete and 0 station at 0400. AROM was offered and accepted.  Began pushing efforts at 0430. Pitocin was started at 0520 for augmentation. Pushed effectively with CNM and RN coaching. FHR with decels with pushing effort, resolution to baseline. Head delivered OA and restituted to TUCKER . No nuchal cord. Shoulders easily delivered under maternal effort.  Live male  delivered at 0724 under epidural anesthesia.  Spontaneous breath, vigorous cry, well flexed, HR>100. Infant directly to maternal abdomen, skin to skin. Delayed cord clamping for >1 minutes then clamped x2 and cut by FOB. 20 units of pitocin infusing after baby with pt's consent. Cord segment for gases. Intact placenta spontaneously delivered via Mckeon at 0728.  3 vessel cord.  Excessive bleeding noted with fundal massage.  800mcg of rectal misoprostol given and sweep of lower uterine segment performed for removal of clots. Fundus firm with massage and bleeding decreased. Vagina, perineum, and rectum inspected, 2nd degree " laceration repaired with 3-0 vicryl suture in the usual fashion. Hemostasis noted. Continued trickle noted with fundal massage and IM methergine given. Mother and infant stable; continued skin to skin. Good family bonding observed.      Delivery Note:   IUP at 40 weeks gestation delivered on 2023.     delivery of a viable Male infant.  Weight : 7 lbs 1.9 oz, 3230g  Apgars of 9 at 1 minute and 9 at 5 minutes.  Labor was induced.  Medications administered  in labor:  Pain Rx Epidural; Antibiotics No; Other n/a  Perineum: 2nd degree  Placenta-mechanism: spontaneous, intact,  with a 3 vessel cord. IV oxytocin was given After delivery of baby Rectal misoprostol and IM methergine given.  Quantitative Blood Loss was 543cc.   Complications of labor and delivery: Category two FHT tracing, Gestational Diabetes, and Prolonged 2nd Stage (>2 hr pr)  Anticipated Discharge Date: 10/22/2023  Birth attendants: JAI Arboleda APRN CNM    INTERVAL HISTORY:  /70 (BP Location: Left arm, Patient Position: Semi-Orellana's, Cuff Size: Adult Small)   Pulse 62   Temp 97.8  F (36.6  C) (Oral)   Resp 16   LMP 2023   SpO2 100%   Breastfeeding Unknown   Patient Vitals for the past 24 hrs:   BP Temp Temp src Pulse Resp   10/23/23 0739 100/70 97.8  F (36.6  C) Oral 62 16   10/23/23 0029 94/59 97.8  F (36.6  C) Oral -- 16   10/22/23 1600 98/57 98  F (36.7  C) Oral 76 16   10/22/23 0930 99/58 98  F (36.7  C) -- 72 16      Pt stable, baby is rooming in. Feels well, partner supportive  Breast feeding status:initiated and getting lactation support for 7% weight loss. Had supplemented with donor milk. Lactation assisting with breastfeeding. Peds will decide if she needs to continue supplementing  Had one elevated BP with normal HELLP labs. No HA/ vision change/ epigastric pain. All BPs wnl.  Hx of GDMA1- one elevated glucose, fastings wnl.     # Discharge Pain Plan:   - During her hospitalization,  Jennifer experienced pain due to .  The pain plan for discharge was discussed with Jennifer and her significant other and the plan was created in a collaborative fashion.    - tylenol, ibuprofen, comfort measures    Complications since 2 hours post delivery: breastfeeding concerns  Patient is tolerating activity well, Voiding without difficulty, cramping is relieved by Ibuprophen, lochia is decreasing and patient denies clots.  Perineal pain is is relieved by Ibuprophen.  The perineum laceration is well approximated    Physical Exam:  General: Bright affect, loving with baby. Family supportive.   Breasts: soft, nontender, nipples intact, no cracking, redness or bruising.   Abdomen: soft, nontender, fundus below U.   Lochia: small amount, rubra, no clots or odor.   Perineum: well-approximated.   Extremities: no edema, Delfino's negative.     Postpartum hemoglobin   Hemoglobin   Date Value Ref Range Status   10/22/2023 10.6 (L) 11.7 - 15.7 g/dL Final      Prenatal Labs:   Lab Results   Component Value Date    AS Negative 10/20/2023    HEPBANG Nonreactive 2023    RUQIGG Positive 2023     Recent Labs   Lab 10/22/23  0632 10/21/23  0932 10/21/23  0603 10/21/23  0450 10/21/23  0327 10/21/23  0235   GLC 75 162* 91 78 79 86      Rubella: immune  History of depression: none. Postpartum depression warning signs reviewed.    ASSESSMENT/PLAN:  Normal postpartum exam , Stable Post-partum day #2  Complications:breastfeeding difficulties- needs supplementation plan per lactation and peds before discharge, Follow-up in 2-3 days, home visit ordered,   vaccination status Candidate for #3 hepatitis B and pt accepts  hx of gestational diabetes, plan for 2 hour OGTT at 6 wks postpartum    Plan d/c home today. Home Visit Ordered- Yes: breastfeeding concerns  RTC 2 weeks and 6 weeks  Postpartum warning s/s reviewed, including bleeding/clots, fever, mastitis, or depression  Continue prenatal vitamins  Birthcontrol  planned:Undecided. Fertility and contraception options reviewed.  PROCEDURES:      Current Discharge Medication List        CONTINUE these medications which have NOT CHANGED    Details   acetaminophen (TYLENOL) 325 MG tablet Take 2 tablets (650 mg) by mouth every 6 hours as needed for mild pain Start after Delivery.  Qty: 100 tablet, Refills: 0    Associated Diagnoses: High risk pregnancy, antepartum      acetone urine (KETOSTIX) test strip Test with first morning void and once negative or trace x 7 day in a row reduce to once weekly testing  Qty: 50 strip, Refills: 1    Associated Diagnoses: Gestational diabetes      blood glucose (NO BRAND SPECIFIED) lancets standard Use to test blood sugar 4 times daily or as directed.  Qty: 124 lancet., Refills: 4    Associated Diagnoses: Gestational diabetes      blood glucose (NO BRAND SPECIFIED) test strip Use to test blood sugar 4 times daily or as directed.  Qty: 124 strip, Refills: 4    Associated Diagnoses: Gestational diabetes      blood glucose monitoring (NO BRAND SPECIFIED) meter device kit Use to test blood sugar 4 times daily or as directed.  Qty: 1 kit, Refills: 0    Associated Diagnoses: Gestational diabetes      blood glucose monitoring (SOFTCLIX) lancets test four times daily      doxylamine (UNISOM) 25 MG TABS tablet Take 25 mg by mouth At Bedtime      ibuprofen (ADVIL/MOTRIN) 600 MG tablet Take 1 tablet (600 mg) by mouth every 6 hours as needed for moderate pain Start after delivery  Qty: 60 tablet, Refills: 0    Associated Diagnoses: High risk pregnancy, antepartum      Prenatal Vit-Fe Fumarate-FA (PRENATAL MULTIVITAMIN W/IRON) 27-0.8 MG tablet Take 1 tablet by mouth daily      senna-docusate (SENOKOT-S/PERICOLACE) 8.6-50 MG tablet Take 1 tablet by mouth daily Start after delivery.  Qty: 100 tablet, Refills: 0    Associated Diagnoses: High risk pregnancy, antepartum           JARED Moyer CNM

## 2023-10-23 NOTE — PLAN OF CARE
Goal Outcome Evaluation:  VSS and postpartum assessments WDL.  Up ad shania with steady gait and independent with cares.  Bonding well with infant.  Breastfeeding on cue every 3 hours followed by supplementing with donor breastmilk.  Pt denied pain overnight.   is present and supportive.  Will continue with postpartum cares and education per plan of care.    Plan of Care Reviewed With: patient    Overall Patient Progress: improvingOverall Patient Progress: improving

## 2023-10-23 NOTE — DISCHARGE INSTRUCTIONS
Vaginal Delivery Discharge Instructions: Frisian  Actividad:   Pida a los miembros de english perez y amigos que la ayuden cuando lo necesite.  No ponga nada en english vagina hasta que english médico lo permita.  Tómese las próximas semanas con calma para que english cuerpo tenga tiempo de recuperarse. En tobi momento puede hacer cualquier actividad que sienta que puede.  No conduzca si está tomando píldoras para el dolor recetadas por english médico. Puede conducir si está tomando píldoras de venta kellie para el dolor.    Llame a english proveedor de atención médica si tiene alguno de estos síntomas:  Empapa gaby toalla femenina con gabe en el correr de 1 hora o ve coágulos más grandes que gaby pelota de golf.  Sangrado que dura más de 6 semanas.  Tiene gaby secreción vaginal que huele mal.   Fiebre de 100.4  F (38  C) o más (temperatura tomada bajo english lengua) con o sin escalofríos   Dolor, calambres o sensibilidad graves en la región inferior de english vientre.  Aumento del dolor, hinchazón, enrojecimiento o líquido alrededor de kailey puntos.  Necesidad más frecuente o urgente de orinar (hacer pis), o ardor al hacerlo.  Enrojecimiento, hinchazón o dolor alrededor de gaby vena en english pierna.  Problemas para amamantar o un área enrojecida o dolorosa en english pecho.  Dolor que aumenta o no se va de gaby episiotomía o desgarro en el perineo.  Náuseas y vómitos  Dolor en el pecho y tos o dificultad para respirar.  Problemas para manejar la tristeza, ansiedad o depresión.   Si le preocupa hacerse daño o hacerle daño al bebé, llame al médico de inmediato.   Tiene preguntas o inquietudes después de regresar a casa.    Mantenga kailey emanuel limpias:  Lávese siempre las emanuel antes de tocar el área de english perineo y los puntos.  [place] ayuda a reducir english riesgo de infección.  Si kailey emanuel no están sucias, puede usar un gel de alcohol para limpiarse las emanuel. Mantenga kailey uñas cortas y limpias.      Vaginal Delivery Discharge Instructions  Activity:   Ask family and  friends for help when you need it.  Do not place anything in your vagina until your doctor approves.  Take it easy for the next few weeks to allow your body to recover. You may do any activities you feel up to at that point.  Do not drive while taking pain pills prescribed by your doctor. You may drive if taking over-the-counter pain pills.    Call your health care provider if you have any of these symptoms:  You soak a sanitary pad with blood within 1 hour, or you see blood clots larger than a golf ball.  Bleeding that lasts more than 6 weeks.  You have vaginal discharge that smells bad.   A fever of 100.4  F (38  C) or higher (temperature taken under your tongue), with or without chills   Severe, pain, cramping or tenderness in your lower belly area.  Increased pain, swelling, redness or fluid around your stitches.  A more frequent or urgent need to urinate (pee), or it burns when you pee.  Redness, swelling or pain around a vein in your leg.  Problems breastfeeding, or a red or painful area on your breast.  Pain that increases or does not go away from an episiotomy or perineal tear.  Nausea and vomiting.  Chest pain and cough or are gasping for air.  Problems coping with sadness, anxiety, or depression.   If you have any concerns about hurting yourself or the baby, call your doctor right away.   You have questions or concerns after you return home.    Keep your hands clean:  Always wash your hands before touching your perineal area and stitches.  This helps reduce your risk of infection.  If your hands aren t dirty, you may use an alcohol hand-rub to clean your hands. Keep your nails clean and short.        Warning Signs after Having a Baby    Keep this paper on your fridge or somewhere else where you can see it.    Call your provider if you have any of these symptoms up to 12 weeks after having your baby.    Thoughts of hurting yourself or your baby  Pain in your chest or trouble breathing  Severe headache not  helped by pain medicine  Eyesight concerns (blurry vision, seeing spots or flashes of light, other changes to eyesight)  Fainting, shaking or other signs of a seizure    Call 9-1-1 if you feel that it is an emergency.     The symptoms below can happen to anyone after giving birth. They can be very serious. Call your provider if you have any of these warning signs.    My provider s phone number: _______________________    Losing too much blood (hemorrhage)    Call your provider if you soak through a pad in less than an hour or pass blood clots bigger than a golf ball. These may be signs that you are bleeding too much.    Blood clots in the legs or lungs    After you give birth, your body naturally clots its blood to help prevent blood loss. Sometimes this increased clotting can happen in other areas of the body, like the legs or lungs. This can block your blood flow and be very dangerous.     Call your provider if you:  Have a red, swollen spot on the back of your leg that is warm or painful when you touch it.   Are coughing up blood.     Infection    Call your provider if you have any of these symptoms:  Fever of 100.4 F (38 C) or higher.  Pain or redness around your stitches if you had an incision.   Any yellow, white, or green fluid coming from places where you had stitches or surgery.    Mood Problems (postpartum depression)    Many people feel sad or have mood changes after having a baby. But for some people, these mood swings are worse.     Call your provider right away if you feel so anxious or nervous that you can't care for yourself or your baby.    Preeclampsia (high blood pressure)    Even if you didn't have high blood pressure when you were pregnant, you are at risk for the high blood pressure disease called preeclampsia. This risk can last up to 12 weeks after giving birth.     Call your provider if you have:   Pain on your right side under your rib cage  Sudden swelling in the hands and  face    Remember: You know your body. If something doesn't feel right, get medical help.     For informational purposes only. Not to replace the advice of your health care provider. Copyright 2020 [employer]. All rights reserved. Clinically reviewed by [facility] [employer] RNC-OB, MSN. spotflux [id number] - Rev 02/23.

## 2023-10-23 NOTE — PLAN OF CARE
Data: Vital signs within normal limits. Postpartum checks within normal limits - see flow record. Patient eating and drinking normally. Patient able to empty bladder independently and is up ambulating. No apparent signs of infection.     healing well. Patient performing self cares and is able to care for infant.  Action: Patient denies pain; declined medication. See MAR. Patient reassessed within 1 hour after each medication and pain was improved - patient stated she was comfortable. Patient education done about plan of care and discharge. See flow record.  Response: Positive attachment behaviors observed with infant. Support person, , Sathya, present.    Patient reported tingling has improved with ambulation.    Patient discharged. All education reviewed, questions addressed, medication reviewed and verified. EDS 1, BC completed, Videos reviewed. Received Pump.

## 2023-10-23 NOTE — LACTATION NOTE
This note was copied from a baby's chart.  Follow Up Consult: first time BF mother, 7.1% weight loss. In-person  present for consult.     Infant's Primary Care Clinic: [facility] or [facility]     Maternal Assessment: Reports minor discomfort with initial latch, states she has noticed her breast milk changing from clear yellow colostrum to more milk white color.       Infant Assessment:  Baby boy has had age appropriate output, weight loss at 24 hours was 7.1%, supplementation of DBM initiated     Weight Change Since Birth: -7% at 2 day old      Feeding History: Per mother baby has been BF frequently in short bursts. LC reviews techniques for keeping baby active at the breast, including STS and breast compressions.      During consult mother has baby in her arms swaddled, he is not showing feeding cues at this time.     LC reviews feeding cues with parents, encouraged on demand feedings. Also reviewed signs baby is getting enough at the breast.     Parents with questions about supplementation; LC encouraged parents to ask pediatrician during morning rounds if they want ongoing supplementation after discharge. Reviewed that supplementation should always occur after breastfeeding and to prioritize mother's own breast milk before DBM/formula. Jennifer has a pump for home (Medela Pump N Style) and encouraged to pump if supplementation is being offered.      [facility] Lactation resources reviewed and encouraged to follow up with LC as needed.       Education:   - Expected  feeding patterns in the first few days (pg. 38 of Your Guide to To Postpartum and Hollins Care)/ the Second Night  - Stages of milk production  - Benefits of hand expression of colostrum  - Early feeding cues     - Benefits of feeding on cue  - Gentle breast compressions as needed to enhance milk transfer  - How to tell when baby is finished  - How to tell if baby is getting enough  - Expected  output  - Hollins weight  loss  - Signs breastfeeding is going well (comfortable latch, audible swallows, age appropriate output and weight loss)    - Pumping recommendations (based on patient need)  - Inpatient breastfeeding support  - Outpatient lactation resources    Handouts: [facility]    Plan: Plan: Continue breastfeeding on cue with RN support as needed with a goal of 8-12 feedings per day. Encourage frequent skin to skin, breast massage and hand expression. Has pump for home use if needed.     [facility]ed [facility] with family. Encouraged follow up with outpatient lactation consultant as needed after discharge. Family plans to follow up with [facility] or [facility].        Darlene Styles RN, IBCLC   Lactation Consultant  Ascom: *[phone]  Office: [phone]

## 2023-11-05 ASSESSMENT — EDINBURGH POSTNATAL DEPRESSION SCALE (EPDS)
I HAVE BEEN ANXIOUS OR WORRIED FOR NO GOOD REASON: NO, NOT AT ALL
I HAVE FELT SAD OR MISERABLE: NO, NOT AT ALL
I HAVE BLAMED MYSELF UNNECESSARILY WHEN THINGS WENT WRONG: NOT VERY OFTEN
I HAVE BEEN SO UNHAPPY THAT I HAVE HAD DIFFICULTY SLEEPING: NOT AT ALL
TOTAL SCORE: 1
I HAVE BEEN SO UNHAPPY THAT I HAVE BEEN CRYING: NO, NEVER
THINGS HAVE BEEN GETTING ON TOP OF ME: NO, I HAVE BEEN COPING AS WELL AS EVER
I HAVE LOOKED FORWARD WITH ENJOYMENT TO THINGS: AS MUCH AS I EVER DID
I HAVE FELT SCARED OR PANICKY FOR NO GOOD REASON: NO, NOT AT ALL
THE THOUGHT OF HARMING MYSELF HAS OCCURRED TO ME: NEVER
I HAVE BEEN ABLE TO LAUGH AND SEE THE FUNNY SIDE OF THINGS: AS MUCH AS I ALWAYS COULD

## 2023-11-06 ENCOUNTER — PRENATAL OFFICE VISIT (OUTPATIENT)
Dept: OBGYN | Facility: CLINIC | Age: 33
End: 2023-11-06
Attending: ADVANCED PRACTICE MIDWIFE
Payer: COMMERCIAL

## 2023-11-06 VITALS
HEART RATE: 82 BPM | HEIGHT: 59 IN | DIASTOLIC BLOOD PRESSURE: 63 MMHG | WEIGHT: 113.2 LBS | SYSTOLIC BLOOD PRESSURE: 92 MMHG | BODY MASS INDEX: 22.82 KG/M2

## 2023-11-06 DIAGNOSIS — O24.410 DIET CONTROLLED GESTATIONAL DIABETES MELLITUS (GDM) IN SECOND TRIMESTER: ICD-10-CM

## 2023-11-06 DIAGNOSIS — Z78.9 NOT IMMUNE TO HEPATITIS B VIRUS: ICD-10-CM

## 2023-11-06 PROBLEM — R03.0 ELEVATED BP WITHOUT DIAGNOSIS OF HYPERTENSION: Status: RESOLVED | Noted: 2023-07-05 | Resolved: 2023-11-06

## 2023-11-06 PROCEDURE — 250N000011 HC RX IP 250 OP 636

## 2023-11-06 PROCEDURE — 99207 PR POST PARTUM EXAM: CPT | Performed by: ADVANCED PRACTICE MIDWIFE

## 2023-11-06 PROCEDURE — G0010 ADMIN HEPATITIS B VACCINE: HCPCS

## 2023-11-06 PROCEDURE — 99213 OFFICE O/P EST LOW 20 MIN: CPT | Mod: 25 | Performed by: ADVANCED PRACTICE MIDWIFE

## 2023-11-06 PROCEDURE — 90746 HEPB VACCINE 3 DOSE ADULT IM: CPT

## 2023-11-06 ASSESSMENT — PATIENT HEALTH QUESTIONNAIRE - PHQ9: SUM OF ALL RESPONSES TO PHQ QUESTIONS 1-9: 3

## 2023-11-06 NOTE — PATIENT INSTRUCTIONS
Thank you for trusting us with your care!     If you need to contact us for questions about:  Symptoms, Scheduling & Medical Questions; Non-urgent (2-3 day response) Sunni message, Urgent (needing response today) [phone] (if after 3:30pm next day response)   Prescriptions: Please call your Pharmacy   Billing: [facility] [phone] or [facility]:[phone]

## 2023-11-06 NOTE — PROGRESS NOTES
"SUBJECTIVE  33 year old  presents for 2 week post partum visit s/p vaginal delivery on 10/21/2023.    Pt is doing well. Lochia is decreasing.  Reports minimal perineal discomfort.     Breastfeeding with good latch to both breasts. Some nipple pain, using lanolin. Occasional formula supplements per patient preference.    OBJECTIVE  BP 92/63   Pulse 82   Ht 1.499 m (4' 11\")   Wt 51.3 kg (113 lb 3.2 oz)   LMP 2023   Breastfeeding Yes   BMI 22.86 kg/m    General- no apparent distress    Extremities- no edema        ASSESSMENT/PLAN  2 weeks postpartum s/p vaginal delivery after induction of for diabetes  - No signs or symptoms of PPD.   -Breastfeeding going well  - Contraception planned: undecided, denies questions today.  Aware of options.  -Discussed recommendation for 2hr glucose, patient does not want to complete test.   - RTO in 4 weeks for 6 week check and prn if questions or concerns    Hepatitis B vaccine administered to patient. Hep B VIS given to patient and discussed vaccine with patient with  on the ipad prior to administration.   "

## 2024-04-30 ENCOUNTER — OFFICE VISIT (OUTPATIENT)
Dept: MIDWIFE SERVICES | Facility: CLINIC | Age: 34
End: 2024-04-30
Payer: COMMERCIAL

## 2024-04-30 VITALS
HEART RATE: 65 BPM | OXYGEN SATURATION: 96 % | SYSTOLIC BLOOD PRESSURE: 93 MMHG | WEIGHT: 106 LBS | DIASTOLIC BLOOD PRESSURE: 57 MMHG | BODY MASS INDEX: 21.41 KG/M2

## 2024-04-30 DIAGNOSIS — Z30.017 INSERTION OF IMPLANTABLE SUBDERMAL CONTRACEPTIVE: ICD-10-CM

## 2024-04-30 DIAGNOSIS — Z30.017 NEXPLANON INSERTION: Primary | ICD-10-CM

## 2024-04-30 LAB — HCG UR QL: NEGATIVE

## 2024-04-30 PROCEDURE — 81025 URINE PREGNANCY TEST: CPT | Performed by: ADVANCED PRACTICE MIDWIFE

## 2024-04-30 PROCEDURE — 11981 INSERTION DRUG DLVR IMPLANT: CPT | Performed by: ADVANCED PRACTICE MIDWIFE

## 2024-04-30 NOTE — PROGRESS NOTES
"Nexplanon Insertion:    Is a pregnancy test required: Yes.  Was it positive or negative?  Negative  Was a consent obtained?  Yes    Subjective: Jennifer Baez is a 33 year old  presents for Nexplanon.    Patient has been given the opportunity to ask questions about all forms of birth control, including all options appropriate for Jennifer Baez. Discussed that no method of birth control, except abstinence is 100% effective against pregnancy or sexually transmitted infection.     Jennifer Baez understands she may have the Nexplanon removed at any time and it should be removed by a health care provider.    The entire insertion procedure was reviewed with the patient, including care after placement.    No LMP recorded. . No allergy to betadine or shellfish. Patient declines STD screening  No results found for: \"HCG\"      BP 93/57 (BP Location: Left arm, Patient Position: Sitting, Cuff Size: Adult Regular)   Pulse 65   Wt 48.1 kg (106 lb)   SpO2 96%   BMI 21.41 kg/m      PROCEDURE NOTE: -- Nexplanon Insertion    Reason for Insertion: contraception    Patient was placed supine with left arm exposed.  Trell was made 8-10 cm above medial epicondyle and a guiding trell 4 cm above the first.  Arm was prepped with Betadine. Insertion point was anesthetized with 3 mL 1% lidocaine. After stretching the skin with thumb and index finger around the insertion site, skin punctured with the tip of the needle inserted at 30 degrees and then lowered to horizontal position. The needle was then advanced to its full length. Applicator was then stabilized and slider was unlocked. Slider was pulled back until it stopped and then removed.    Correct placement of the implant was confirmed by palpation in the patient's arm and visualizing the purple top of the obturator.   Bandage and pressure dressing applied to insertion site.    Lot # [id number]   Exp: 2025     EBL: minimal    Complications: " none    ASSESSMENT:     ICD-10-CM    1. Nexplanon insertion  Z30.017 HCG Qual, Urine ([id number])     etonogestrel (NEXPLANON) subdermal implant 68 mg     INSERTION NON-BIODEGRADABLE DRUG DELIVERY IMPLANT      2. Insertion of implantable subdermal contraceptive  Z30.017 etonogestrel (NEXPLANON) subdermal implant 68 mg     INSERTION NON-BIODEGRADABLE DRUG DELIVERY IMPLANT             PLAN:    Given 's handouts, including when to have Nexplanon removed, list of danger s/sx, side effects and follow up recommended. Encouraged condom use for prevention of STD. Back up contraception advised for 7 days. Advised to call for any fever, for prolonged or severe pain or bleeding, abnormal vaginal dischage. She was advised to use pain medications (ibuprofen) as needed for mild to moderate pain.     JARED Natarajan CNM

## 2024-07-28 ENCOUNTER — HEALTH MAINTENANCE LETTER (OUTPATIENT)
Age: 34
End: 2024-07-28

## 2025-08-10 ENCOUNTER — HEALTH MAINTENANCE LETTER (OUTPATIENT)
Age: 35
End: 2025-08-10